# Patient Record
Sex: FEMALE | Employment: OTHER | URBAN - METROPOLITAN AREA
[De-identification: names, ages, dates, MRNs, and addresses within clinical notes are randomized per-mention and may not be internally consistent; named-entity substitution may affect disease eponyms.]

---

## 2023-08-11 ENCOUNTER — TELEPHONE (OUTPATIENT)
Age: 38
End: 2023-08-11

## 2023-08-11 NOTE — TELEPHONE ENCOUNTER
Hourly rounds completed this shift. All needs met at this time. Bed low/locked. Call light within reach. Will continue to monitor and give bedside report to oncoming nurse. Pt has been NPO since MN. Sent pt [employer] message to reschedule appt she has 10/5/23 due to NP [facility] out of office this day. Please reschedule pt with another provider. -TM 8/11/23

## 2023-11-03 SDOH — HEALTH STABILITY: PHYSICAL HEALTH: ON AVERAGE, HOW MANY DAYS PER WEEK DO YOU ENGAGE IN MODERATE TO STRENUOUS EXERCISE (LIKE A BRISK WALK)?: 5 DAYS

## 2023-11-03 SDOH — HEALTH STABILITY: PHYSICAL HEALTH: ON AVERAGE, HOW MANY MINUTES DO YOU ENGAGE IN EXERCISE AT THIS LEVEL?: 30 MIN

## 2023-11-06 ENCOUNTER — OFFICE VISIT (OUTPATIENT)
Age: 38
End: 2023-11-06
Payer: COMMERCIAL

## 2023-11-06 VITALS
DIASTOLIC BLOOD PRESSURE: 78 MMHG | SYSTOLIC BLOOD PRESSURE: 123 MMHG | HEIGHT: 65 IN | RESPIRATION RATE: 16 BRPM | WEIGHT: 197 LBS | BODY MASS INDEX: 32.82 KG/M2 | OXYGEN SATURATION: 100 % | HEART RATE: 101 BPM | TEMPERATURE: 98.2 F

## 2023-11-06 DIAGNOSIS — D50.9 IRON DEFICIENCY ANEMIA, UNSPECIFIED IRON DEFICIENCY ANEMIA TYPE: ICD-10-CM

## 2023-11-06 DIAGNOSIS — H93.A1 PULSATILE TINNITUS OF RIGHT EAR: ICD-10-CM

## 2023-11-06 DIAGNOSIS — K21.9 GASTROESOPHAGEAL REFLUX DISEASE WITHOUT ESOPHAGITIS: Primary | ICD-10-CM

## 2023-11-06 DIAGNOSIS — E05.90 HYPERTHYROIDISM: ICD-10-CM

## 2023-11-06 DIAGNOSIS — F41.1 GAD (GENERALIZED ANXIETY DISORDER): ICD-10-CM

## 2023-11-06 DIAGNOSIS — Z76.89 ENCOUNTER TO ESTABLISH CARE: ICD-10-CM

## 2023-11-06 LAB
ALBUMIN SERPL-MCNC: 3.7 G/DL (ref 3.5–5)
ALBUMIN/GLOB SERPL: 1 (ref 1.1–2.2)
ALP SERPL-CCNC: 69 U/L (ref 45–117)
ALT SERPL-CCNC: 17 U/L (ref 12–78)
ANION GAP SERPL CALC-SCNC: 7 MMOL/L (ref 5–15)
AST SERPL-CCNC: 11 U/L (ref 15–37)
BILIRUB SERPL-MCNC: 0.4 MG/DL (ref 0.2–1)
BUN SERPL-MCNC: 12 MG/DL (ref 6–20)
BUN/CREAT SERPL: 21 (ref 12–20)
CALCIUM SERPL-MCNC: 8.8 MG/DL (ref 8.5–10.1)
CHLORIDE SERPL-SCNC: 108 MMOL/L (ref 97–108)
CO2 SERPL-SCNC: 25 MMOL/L (ref 21–32)
CREAT SERPL-MCNC: 0.58 MG/DL (ref 0.55–1.02)
ERYTHROCYTE [DISTWIDTH] IN BLOOD BY AUTOMATED COUNT: 12.2 % (ref 11.5–14.5)
FERRITIN SERPL-MCNC: 13 NG/ML (ref 8–252)
GLOBULIN SER CALC-MCNC: 3.8 G/DL (ref 2–4)
GLUCOSE SERPL-MCNC: 100 MG/DL (ref 65–100)
HCT VFR BLD AUTO: 39.7 % (ref 35–47)
HGB BLD-MCNC: 13 G/DL (ref 11.5–16)
IRON SATN MFR SERPL: 11 % (ref 20–50)
IRON SERPL-MCNC: 38 UG/DL (ref 35–150)
MCH RBC QN AUTO: 27.5 PG (ref 26–34)
MCHC RBC AUTO-ENTMCNC: 32.7 G/DL (ref 30–36.5)
MCV RBC AUTO: 84.1 FL (ref 80–99)
NRBC # BLD: 0 K/UL (ref 0–0.01)
NRBC BLD-RTO: 0 PER 100 WBC
PLATELET # BLD AUTO: 261 K/UL (ref 150–400)
PMV BLD AUTO: 10.7 FL (ref 8.9–12.9)
POTASSIUM SERPL-SCNC: 4.2 MMOL/L (ref 3.5–5.1)
PROT SERPL-MCNC: 7.5 G/DL (ref 6.4–8.2)
RBC # BLD AUTO: 4.72 M/UL (ref 3.8–5.2)
SODIUM SERPL-SCNC: 140 MMOL/L (ref 136–145)
T4 FREE SERPL-MCNC: 1.9 NG/DL (ref 0.8–1.5)
TIBC SERPL-MCNC: 332 UG/DL (ref 250–450)
TSH SERPL DL<=0.05 MIU/L-ACNC: 0.01 UIU/ML (ref 0.36–3.74)
WBC # BLD AUTO: 5.6 K/UL (ref 3.6–11)

## 2023-11-06 PROCEDURE — 99204 OFFICE O/P NEW MOD 45 MIN: CPT | Performed by: NURSE PRACTITIONER

## 2023-11-06 RX ORDER — OMEPRAZOLE 20 MG/1
20 CAPSULE, DELAYED RELEASE ORAL DAILY
Qty: 30 CAPSULE | Refills: 5 | Status: SHIPPED | OUTPATIENT
Start: 2023-11-06

## 2023-11-06 RX ORDER — ESCITALOPRAM OXALATE 10 MG/1
10 TABLET ORAL DAILY
Qty: 30 TABLET | Refills: 1 | Status: SHIPPED | OUTPATIENT
Start: 2023-11-06

## 2023-11-06 RX ORDER — OMEPRAZOLE 20 MG/1
20 CAPSULE, DELAYED RELEASE ORAL DAILY
COMMUNITY
End: 2023-11-06 | Stop reason: SDUPTHER

## 2023-11-06 SDOH — ECONOMIC STABILITY: HOUSING INSECURITY
IN THE LAST 12 MONTHS, WAS THERE A TIME WHEN YOU DID NOT HAVE A STEADY PLACE TO SLEEP OR SLEPT IN A SHELTER (INCLUDING NOW)?: NO

## 2023-11-06 SDOH — ECONOMIC STABILITY: FOOD INSECURITY: WITHIN THE PAST 12 MONTHS, YOU WORRIED THAT YOUR FOOD WOULD RUN OUT BEFORE YOU GOT MONEY TO BUY MORE.: NEVER TRUE

## 2023-11-06 SDOH — ECONOMIC STABILITY: INCOME INSECURITY: HOW HARD IS IT FOR YOU TO PAY FOR THE VERY BASICS LIKE FOOD, HOUSING, MEDICAL CARE, AND HEATING?: NOT HARD AT ALL

## 2023-11-06 SDOH — ECONOMIC STABILITY: FOOD INSECURITY: WITHIN THE PAST 12 MONTHS, THE FOOD YOU BOUGHT JUST DIDN'T LAST AND YOU DIDN'T HAVE MONEY TO GET MORE.: NEVER TRUE

## 2023-11-06 ASSESSMENT — ENCOUNTER SYMPTOMS
SINUS PAIN: 0
SHORTNESS OF BREATH: 0
SINUS PRESSURE: 0
CHEST TIGHTNESS: 0

## 2023-11-06 ASSESSMENT — PATIENT HEALTH QUESTIONNAIRE - PHQ9
1. LITTLE INTEREST OR PLEASURE IN DOING THINGS: 0
SUM OF ALL RESPONSES TO PHQ QUESTIONS 1-9: 0
2. FEELING DOWN, DEPRESSED OR HOPELESS: 0
SUM OF ALL RESPONSES TO PHQ QUESTIONS 1-9: 0
SUM OF ALL RESPONSES TO PHQ9 QUESTIONS 1 & 2: 0

## 2023-11-06 NOTE — PROGRESS NOTES
Chief Complaint   Patient presents with    New Patient     1. \"Have you been to the ER, urgent care clinic since your last visit? Hospitalized since your last visit? \" UC for ear problem 2 months ago negative exam     2. \"Have you seen or consulted any other health care providers outside of the [address] [place] [address] since your last visit? \"  No     Pap - yes done 2022 advised every 3 years   Just moved here from [address] [place] [address] - done 2022

## 2023-11-09 DIAGNOSIS — E05.00 GRAVES DISEASE: Primary | ICD-10-CM

## 2023-11-15 DIAGNOSIS — H93.A9 PULSATILE TINNITUS: Primary | ICD-10-CM

## 2023-12-04 ENCOUNTER — OFFICE VISIT (OUTPATIENT)
Age: 38
End: 2023-12-04
Payer: COMMERCIAL

## 2023-12-04 VITALS
TEMPERATURE: 97.7 F | RESPIRATION RATE: 18 BRPM | SYSTOLIC BLOOD PRESSURE: 112 MMHG | HEART RATE: 78 BPM | DIASTOLIC BLOOD PRESSURE: 76 MMHG | HEIGHT: 65 IN | OXYGEN SATURATION: 99 % | BODY MASS INDEX: 32.89 KG/M2 | WEIGHT: 197.4 LBS

## 2023-12-04 DIAGNOSIS — F41.1 GAD (GENERALIZED ANXIETY DISORDER): Primary | ICD-10-CM

## 2023-12-04 DIAGNOSIS — E05.90 HYPERTHYROIDISM: ICD-10-CM

## 2023-12-04 PROCEDURE — 99213 OFFICE O/P EST LOW 20 MIN: CPT | Performed by: NURSE PRACTITIONER

## 2023-12-04 RX ORDER — ESCITALOPRAM OXALATE 10 MG/1
10 TABLET ORAL DAILY
Qty: 90 TABLET | Refills: 1 | Status: SHIPPED | OUTPATIENT
Start: 2023-12-04

## 2023-12-04 RX ORDER — FERROUS SULFATE 325(65) MG
325 TABLET ORAL
COMMUNITY

## 2023-12-04 ASSESSMENT — PATIENT HEALTH QUESTIONNAIRE - PHQ9
SUM OF ALL RESPONSES TO PHQ QUESTIONS 1-9: 0
2. FEELING DOWN, DEPRESSED OR HOPELESS: 0
SUM OF ALL RESPONSES TO PHQ QUESTIONS 1-9: 0
1. LITTLE INTEREST OR PLEASURE IN DOING THINGS: 0
SUM OF ALL RESPONSES TO PHQ9 QUESTIONS 1 & 2: 0
SUM OF ALL RESPONSES TO PHQ QUESTIONS 1-9: 0
SUM OF ALL RESPONSES TO PHQ QUESTIONS 1-9: 0

## 2023-12-04 ASSESSMENT — ENCOUNTER SYMPTOMS
CHEST TIGHTNESS: 0
SHORTNESS OF BREATH: 0

## 2024-02-12 ENCOUNTER — OFFICE VISIT (OUTPATIENT)
Age: 39
End: 2024-02-12
Payer: COMMERCIAL

## 2024-02-12 VITALS
WEIGHT: 207.6 LBS | HEIGHT: 65 IN | RESPIRATION RATE: 16 BRPM | DIASTOLIC BLOOD PRESSURE: 71 MMHG | BODY MASS INDEX: 34.59 KG/M2 | SYSTOLIC BLOOD PRESSURE: 108 MMHG | OXYGEN SATURATION: 99 % | HEART RATE: 65 BPM | TEMPERATURE: 98 F

## 2024-02-12 DIAGNOSIS — D50.9 IRON DEFICIENCY ANEMIA, UNSPECIFIED IRON DEFICIENCY ANEMIA TYPE: ICD-10-CM

## 2024-02-12 DIAGNOSIS — F41.1 GAD (GENERALIZED ANXIETY DISORDER): ICD-10-CM

## 2024-02-12 DIAGNOSIS — E05.90 HYPERTHYROIDISM: Primary | ICD-10-CM

## 2024-02-12 DIAGNOSIS — K21.9 GASTROESOPHAGEAL REFLUX DISEASE WITHOUT ESOPHAGITIS: ICD-10-CM

## 2024-02-12 PROCEDURE — 99214 OFFICE O/P EST MOD 30 MIN: CPT | Performed by: NURSE PRACTITIONER

## 2024-02-12 NOTE — PROGRESS NOTES
Subjective:      Patient ID: Kianna Cuellar is a 38 y.o. female.    HPI  Follow up health problems.  Follow up hyperthyroid.  She is s/p radioactive ablation.  Was on methimazole for about a year then stopped when thyroid function normalized.  Had labs done here last month with mild T4 elevation and low TSH.  Had previous labs done when living in [place].    Has upcoming appointment in May with Dr. Genao.    LADONNA.  Taking lexapro with good symptoms control.    GERD.  Taking omeprazole daily.   GLORIA with history of menorrhagia.  Followed by gyn.  Taking daily iron supplement.  Reports still has heavy menses first 2 days of cycle lasting about 4-5 days. Had IUD in the past but discontinued due to recurrent BV.   Last labs stable.     Patient Active Problem List   Diagnosis    Gastroesophageal reflux disease without esophagitis    Hyperthyroidism    Iron deficiency anemia    LADONNA (generalized anxiety disorder)     Current Outpatient Medications   Medication Sig    ferrous sulfate (IRON 325) 325 (65 Fe) MG tablet Take 1 tablet by mouth daily (with breakfast)    escitalopram (LEXAPRO) 10 MG tablet Take 1 tablet by mouth daily    omeprazole (PRILOSEC) 20 MG delayed release capsule Take 1 capsule by mouth daily Heartburn/gerd     No current facility-administered medications for this visit.     Social History     Tobacco Use    Smoking status: Never    Smokeless tobacco: Never   Vaping Use    Vaping Use: Never used   Substance Use Topics    Alcohol use: Never    Drug use: Never     Blood pressure 108/71, pulse 65, temperature 98 °F (36.7 °C), temperature source Temporal, resp. rate 16, height 1.651 m (5' 5\"), weight 94.2 kg (207 lb 9.6 oz), last menstrual period 02/05/2024, SpO2 99 %.      Review of Systems   Constitutional:  Negative for fatigue.   Respiratory:  Negative for chest tightness and shortness of breath.    Cardiovascular:  Positive for palpitations (rare). Negative for chest pain and leg swelling.   Genitourinary:

## 2024-02-12 NOTE — PROGRESS NOTES
Chief Complaint   Patient presents with    Hypothyroidism     Follow up    Medication Check     Follow up     \"Have you been to the ER, urgent care clinic since your last visit?  Hospitalized since your last visit?\"    NO    “Have you seen or consulted any other health care providers outside of [facility] since your last visit?”    NO        “Have you had a pap smear?”    YES - Where: at [facility] [place] Nurse/CMA to request most recent records if not in the chart                2/12/2024     8:58 AM   PHQ-9    Little interest or pleasure in doing things 0   Feeling down, depressed, or hopeless 0   PHQ-2 Score 0   PHQ-9 Total Score 0           Financial Resource Strain: Low Risk  (11/6/2023)    Overall Financial Resource Strain (CARDIA)     Difficulty of Paying Living Expenses: Not hard at all      Food Insecurity: Not on file (11/6/2023)          Health Maintenance Due   Topic Date Due    Hepatitis B vaccine (1 of 3 - 3-dose series) Never done    COVID-19 Vaccine (1) Never done    Varicella vaccine (1 of 2 - 2-dose childhood series) Never done    HIV screen  Never done    Hepatitis C screen  Never done    DTaP/Tdap/Td vaccine (1 - Tdap) Never done    Cervical cancer screen  Never done    Flu vaccine (1) Never done

## 2024-02-13 LAB
ANION GAP SERPL CALC-SCNC: 3 MMOL/L (ref 5–15)
BUN SERPL-MCNC: 13 MG/DL (ref 6–20)
BUN/CREAT SERPL: 21 (ref 12–20)
CALCIUM SERPL-MCNC: 8.8 MG/DL (ref 8.5–10.1)
CHLORIDE SERPL-SCNC: 113 MMOL/L (ref 97–108)
CO2 SERPL-SCNC: 25 MMOL/L (ref 21–32)
CREAT SERPL-MCNC: 0.61 MG/DL (ref 0.55–1.02)
ERYTHROCYTE [DISTWIDTH] IN BLOOD BY AUTOMATED COUNT: 13.2 % (ref 11.5–14.5)
FERRITIN SERPL-MCNC: 10 NG/ML (ref 26–388)
GLUCOSE SERPL-MCNC: 99 MG/DL (ref 65–100)
HCT VFR BLD AUTO: 39.2 % (ref 35–47)
HGB BLD-MCNC: 12 G/DL (ref 11.5–16)
IRON SATN MFR SERPL: 17 % (ref 20–50)
IRON SERPL-MCNC: 52 UG/DL (ref 35–150)
MCH RBC QN AUTO: 26.1 PG (ref 26–34)
MCHC RBC AUTO-ENTMCNC: 30.6 G/DL (ref 30–36.5)
MCV RBC AUTO: 85.4 FL (ref 80–99)
NRBC # BLD: 0 K/UL (ref 0–0.01)
NRBC BLD-RTO: 0 PER 100 WBC
PLATELET # BLD AUTO: 277 K/UL (ref 150–400)
PMV BLD AUTO: 11.1 FL (ref 8.9–12.9)
POTASSIUM SERPL-SCNC: 4.2 MMOL/L (ref 3.5–5.1)
RBC # BLD AUTO: 4.59 M/UL (ref 3.8–5.2)
SODIUM SERPL-SCNC: 141 MMOL/L (ref 136–145)
T4 FREE SERPL-MCNC: 1.7 NG/DL (ref 0.8–1.5)
TIBC SERPL-MCNC: 315 UG/DL (ref 250–450)
TSH SERPL DL<=0.05 MIU/L-ACNC: <0.01 UIU/ML (ref 0.36–3.74)
WBC # BLD AUTO: 4.3 K/UL (ref 3.6–11)

## 2024-05-02 ENCOUNTER — OFFICE VISIT (OUTPATIENT)
Age: 39
End: 2024-05-02
Payer: COMMERCIAL

## 2024-05-02 VITALS
OXYGEN SATURATION: 97 % | RESPIRATION RATE: 16 BRPM | SYSTOLIC BLOOD PRESSURE: 116 MMHG | WEIGHT: 205.1 LBS | DIASTOLIC BLOOD PRESSURE: 72 MMHG | HEART RATE: 64 BPM | HEIGHT: 65 IN | BODY MASS INDEX: 34.17 KG/M2

## 2024-05-02 DIAGNOSIS — E05.00 GRAVES DISEASE: Primary | ICD-10-CM

## 2024-05-02 PROCEDURE — 99204 OFFICE O/P NEW MOD 45 MIN: CPT | Performed by: INTERNAL MEDICINE

## 2024-05-02 RX ORDER — METHIMAZOLE 10 MG/1
10 TABLET ORAL DAILY
Qty: 90 TABLET | Refills: 1 | Status: SHIPPED | OUTPATIENT
Start: 2024-05-02 | End: 2024-06-01

## 2024-05-02 NOTE — PROGRESS NOTES
Chief Complaint   Patient presents with    Thyroid Problem     Pt stated that she has not needed  thyroid medications in over 10 yrs.      New Patient    Weight Management     History of Present Illness: Kianna Cuellar is a 38 y.o. female seen for discussion related to uncontrolled Graves' disease.    Received radioactive iodine in 2006, 2 years.  Reports that symptoms never resolved.  Does take daily iron, menstrual periods became much heavier after the birth of her daughter in 2005.  Half sister also has hypERthyroidism.  Has noted palpitations, tremor    Past Medical History:   Diagnosis Date    GERD (gastroesophageal reflux disease) 3/2021    Hyperthyroidism 8/2006     Past Surgical History:   Procedure Laterality Date    CHOLECYSTECTOMY  12/2022    UPPER GASTROINTESTINAL ENDOSCOPY  1/2023     Current Outpatient Medications   Medication Sig    ferrous sulfate (IRON 325) 325 (65 Fe) MG tablet Take 1 tablet by mouth daily (with breakfast)    escitalopram (LEXAPRO) 10 MG tablet Take 1 tablet by mouth daily    omeprazole (PRILOSEC) 20 MG delayed release capsule Take 1 capsule by mouth daily Heartburn/gerd     No current facility-administered medications for this visit.     No Known Allergies  Family History   Problem Relation Age of Onset    High Blood Pressure Mother     Cancer Father         Kidney    Colon Cancer Maternal Grandmother     High Blood Pressure Maternal Grandmother     Stroke Maternal Grandmother     Colon Cancer Maternal Aunt        Social Hx: 19-year-old daughter at school in [place]  Seldom alcohol, non-smoker    Review of Systems:  See HPI    Physical Examination:  /72   Pulse 64   Resp 16   Ht 1.651 m (5' 5\")   Wt 93 kg (205 lb 1.6 oz)   SpO2 97%   BMI 34.13 kg/m²     - GENERAL: NCAT, Appears hyperthyroid  - EYES: Prominent stare  - Ear/Nose/Throat: Does have thyromegaly  - CARDIOVASCULAR: no cyanosis, no visible JVD   - RESPIRATORY: respiratory effort normal without any distress or

## 2024-05-17 LAB
ALBUMIN SERPL-MCNC: 3.6 G/DL (ref 3.5–5)
ALBUMIN/GLOB SERPL: 1 (ref 1.1–2.2)
ALP SERPL-CCNC: 81 U/L (ref 45–117)
ALT SERPL-CCNC: 24 U/L (ref 12–78)
AST SERPL-CCNC: 16 U/L (ref 15–37)
BILIRUB DIRECT SERPL-MCNC: 0.1 MG/DL (ref 0–0.2)
BILIRUB SERPL-MCNC: 0.3 MG/DL (ref 0.2–1)
GLOBULIN SER CALC-MCNC: 3.7 G/DL (ref 2–4)
PROT SERPL-MCNC: 7.3 G/DL (ref 6.4–8.2)
T4 FREE SERPL-MCNC: 1.4 NG/DL (ref 0.8–1.5)
TSH SERPL DL<=0.05 MIU/L-ACNC: <0.01 UIU/ML (ref 0.36–3.74)
WBC # BLD AUTO: 6.1 K/UL (ref 3.6–11)

## 2024-05-18 LAB — TSI ACT/NOR SER: 1.19 IU/L (ref 0–0.55)

## 2024-05-19 LAB — T3 SERPL-MCNC: 159 NG/DL (ref 71–180)

## 2024-05-20 DIAGNOSIS — E05.00 GRAVES DISEASE: Primary | ICD-10-CM

## 2024-05-20 RX ORDER — METHIMAZOLE 10 MG/1
10 TABLET ORAL DAILY
Qty: 90 TABLET | Refills: 1 | Status: SHIPPED | OUTPATIENT
Start: 2024-05-20 | End: 2024-06-19

## 2024-05-23 DIAGNOSIS — K21.9 GASTROESOPHAGEAL REFLUX DISEASE WITHOUT ESOPHAGITIS: ICD-10-CM

## 2024-05-23 RX ORDER — OMEPRAZOLE 20 MG/1
CAPSULE, DELAYED RELEASE ORAL
Qty: 30 CAPSULE | Refills: 5 | Status: SHIPPED | OUTPATIENT
Start: 2024-05-23

## 2024-05-23 NOTE — TELEPHONE ENCOUNTER
PCP: Bisi Ruffin APRN - NP      Future Appointments   Date Time Provider Department Center   8/13/2024  8:30 AM Chitra Genao MD RDE [facility] BS AMB   8/22/2024  9:20 AM Bisi Ruffin APRN - NP [facility]       Requested Prescriptions     Pending Prescriptions Disp Refills    omeprazole (PRILOSEC) 20 MG delayed release capsule [Pharmacy Med Name: OMEPRAZOLE DR 20 MG CAPSULE] 30 capsule 5     Sig: TAKE 1 CAPSULE BY MOUTH DAILY (FOR HEARTBURN/GERD)       Prior labs and Blood pressures:  BP Readings from Last 3 Encounters:   05/02/24 116/72   02/12/24 108/71   12/04/23 112/76     Lab Results   Component Value Date/Time     02/12/2024 09:32 AM    K 4.2 02/12/2024 09:32 AM     02/12/2024 09:32 AM    CO2 25 02/12/2024 09:32 AM    BUN 13 02/12/2024 09:32 AM     No results found for: \"HBA1C\", \"PMJ8HPVP\"  No results found for: \"CHOL\", \"CHOLPOCT\", \"CHLST\", \"CHOLV\", \"HDL\", \"HDLPOC\", \"HDLC\", \"LDL\", \"VLDLC\", \"VLDL\"  No results found for: \"VITD3\"    No results found for: \"TSH\", \"TSH2\", \"TSH3\"

## 2024-06-13 ENCOUNTER — TELEPHONE (OUTPATIENT)
Age: 39
End: 2024-06-13

## 2024-06-13 DIAGNOSIS — F41.1 GAD (GENERALIZED ANXIETY DISORDER): ICD-10-CM

## 2024-06-13 DIAGNOSIS — K21.9 GASTROESOPHAGEAL REFLUX DISEASE WITHOUT ESOPHAGITIS: ICD-10-CM

## 2024-06-13 NOTE — TELEPHONE ENCOUNTER
NP Augustin,    Call from patient pertaining to issue with omeprazole.      Noted rx 5/23/24 for 30 + 5.    Contacted [facility]:    Rejecting: Max QTY of 180/365 days.      Requires PA     Call [phone]    Or via [employer]    ThanksNallely      For Pharmacy Admin Tracking Only    Program: Medication Refill  CPA in place:    Recommendation Provided To:   Intervention Detail: New Rx: 1, reason: Patient Preference  Intervention Accepted By:   Gap Closed?:    Time Spent (min): 5

## 2024-06-14 RX ORDER — ESCITALOPRAM OXALATE 10 MG/1
10 TABLET ORAL DAILY
Qty: 90 TABLET | Refills: 1 | Status: SHIPPED | OUTPATIENT
Start: 2024-06-14

## 2024-06-14 RX ORDER — OMEPRAZOLE 20 MG/1
CAPSULE, DELAYED RELEASE ORAL
Qty: 30 CAPSULE | Refills: 5 | OUTPATIENT
Start: 2024-06-14

## 2024-06-14 NOTE — TELEPHONE ENCOUNTER
PCP: Bisi Ruffin APRN - NP      Future Appointments   Date Time Provider Department Center   8/13/2024  8:30 AM Chitra Genao MD RDE [facility] BS AMB   8/22/2024  9:20 AM [facility]       Requested Prescriptions     Pending Prescriptions Disp Refills    escitalopram (LEXAPRO) 10 MG tablet 90 tablet 1     Sig: Take 1 tablet by mouth daily       Prior labs and Blood pressures:  BP Readings from Last 3 Encounters:   05/02/24 116/72   02/12/24 108/71   12/04/23 112/76     Lab Results   Component Value Date/Time     02/12/2024 09:32 AM    K 4.2 02/12/2024 09:32 AM     02/12/2024 09:32 AM    CO2 25 02/12/2024 09:32 AM    BUN 13 02/12/2024 09:32 AM     No results found for: \"HBA1C\", \"SEN0UXKN\"  No results found for: \"CHOL\", \"CHOLPOCT\", \"CHLST\", \"CHOLV\", \"HDL\", \"HDLPOC\", \"HDLC\", \"LDL\", \"VLDLC\", \"VLDL\"  No results found for: \"VITD3\"    No results found for: \"TSH\", \"TSH2\", \"TSH3\"

## 2024-06-17 LAB
ALBUMIN SERPL-MCNC: 3.7 G/DL (ref 3.5–5)
ALBUMIN/GLOB SERPL: 1 (ref 1.1–2.2)
ALP SERPL-CCNC: 79 U/L (ref 45–117)
ALT SERPL-CCNC: 21 U/L (ref 12–78)
AST SERPL-CCNC: 12 U/L (ref 15–37)
BILIRUB DIRECT SERPL-MCNC: 0.1 MG/DL (ref 0–0.2)
BILIRUB SERPL-MCNC: 0.4 MG/DL (ref 0.2–1)
GLOBULIN SER CALC-MCNC: 3.6 G/DL (ref 2–4)
PROT SERPL-MCNC: 7.3 G/DL (ref 6.4–8.2)
T4 FREE SERPL-MCNC: 1.3 NG/DL (ref 0.8–1.5)
TSH SERPL DL<=0.05 MIU/L-ACNC: 0.02 UIU/ML (ref 0.36–3.74)
WBC # BLD AUTO: 6.1 K/UL (ref 3.6–11)

## 2024-06-18 LAB — TSI ACT/NOR SER: 1.04 IU/L (ref 0–0.55)

## 2024-06-19 LAB — T3 SERPL-MCNC: 138 NG/DL (ref 71–180)

## 2024-06-21 DIAGNOSIS — E05.00 GRAVES DISEASE: Primary | ICD-10-CM

## 2024-06-21 RX ORDER — METHIMAZOLE 10 MG/1
10 TABLET ORAL DAILY
Qty: 90 TABLET | Refills: 1 | Status: SHIPPED | OUTPATIENT
Start: 2024-06-21 | End: 2024-07-21

## 2024-07-24 DIAGNOSIS — F41.1 GAD (GENERALIZED ANXIETY DISORDER): Primary | ICD-10-CM

## 2024-07-31 ENCOUNTER — OFFICE VISIT (OUTPATIENT)
Age: 39
End: 2024-07-31
Payer: COMMERCIAL

## 2024-07-31 DIAGNOSIS — F41.9 ANXIETY: Primary | ICD-10-CM

## 2024-07-31 PROCEDURE — 90791 PSYCH DIAGNOSTIC EVALUATION: CPT | Performed by: SOCIAL WORKER

## 2024-07-31 NOTE — PROGRESS NOTES
In Office-Initial Evaluation Billing    Session Time     Start Time:    10:00 am  Finish Time:  10:58 am    Total time spent for this encounter:  58 minutes    Follow up:  August 14 @ 10 in office    We did set a follow-up appointment with this clinician.        Therapy Modalities   Building Insight and Cognitive Behavioral    Assessment / Plan     Kianna Cuellar is a 38 y.o. female who is alert and oriented X3.  She does not have any suicidal or homicidal ideations.  Patient is not psychotic or delusional.   She has not been psychiatrically admitted to a hospital. Ms. Cuellar does have good eye contact during this interview. She is  verbal and engaging.  Patient does have good insight and judgement.  She is  a good candidate for short term therapy to address the above issues.      Psychotherapy Target Symptoms:  anxiety and OCD     Assessment:   initial appt    Plan:  continue psychotherapy    1. Anxiety       --Corazon Zelaya LCSW on 7/31/2024 at 12:03 PM    An electronic signature was used to authenticate this note.

## 2024-07-31 NOTE — PSYCHOTHERAPY
In Office-Initial Evaluation    Time Start:10:00 am  Time End:10:58 am    DX:    Diagnosis Orders   1. Anxiety                 Met with Ms. Cuellar 7/31/2024 for our first appointment.  This patient was referred by her NP due to anxiety and OCD.  She has been prescribed Lexapro 10 mg and is reporting she is doing better with her ruminating thoughts and some of her behaviors. Ms. Cuellar has been on Lexapro since November 2023.   She is happy that she is here as she has wanted to start therapy for awhile.    Ms. Cuellar has lived in [place] for about a year and moved here from [place].  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  She grew up being raised by a single mother and has a sister 13 years older.  She only \"saw\" her father the first several months of her life.  Ms. Cuellar got pregnant as a teen and dropped out of high school.  As a teen, the patient moved out of her home with her mother and lived with her maternal grandmother.  It apparently was a difficult time for her mother and she could not \"handle having a teen in the home.\"   The patient later went back and got her GED and attended college at the [employer] while raising her infant daughter.  Her daughter is now 19 years old and attending the [employer].  When Ms. Cuellar moved to [place] it was because her mother had retired and decided to move here, however, since that time, they have had a falling out and have not spoken.  The patient also reports she hasn't spoken to her older sister for over 10 years.  Ms. Cuellar reports when she was in high school she started pulling her eyelashes out as a way to \"calm myself.\"  She will sometimes still resort to doing this but she is much more aware of this behavior and tries to distract herself with something else.  Although the medication has helped her with her OCD, she still finds she will act on some of her ruminations.    Kianna Cuellar is a 38 y.o.

## 2024-08-06 LAB
ALBUMIN SERPL-MCNC: 3.5 G/DL (ref 3.5–5)
ALBUMIN/GLOB SERPL: 0.9 (ref 1.1–2.2)
ALP SERPL-CCNC: 93 U/L (ref 45–117)
ALT SERPL-CCNC: 18 U/L (ref 12–78)
AST SERPL-CCNC: 9 U/L (ref 15–37)
BILIRUB DIRECT SERPL-MCNC: 0.1 MG/DL (ref 0–0.2)
BILIRUB SERPL-MCNC: 0.4 MG/DL (ref 0.2–1)
GLOBULIN SER CALC-MCNC: 4 G/DL (ref 2–4)
PROT SERPL-MCNC: 7.5 G/DL (ref 6.4–8.2)
T4 FREE SERPL-MCNC: 1.1 NG/DL (ref 0.8–1.5)
TSH SERPL DL<=0.05 MIU/L-ACNC: 0.72 UIU/ML (ref 0.36–3.74)
WBC # BLD AUTO: 6 K/UL (ref 3.6–11)

## 2024-08-07 LAB — T3 SERPL-MCNC: 127 NG/DL (ref 71–180)

## 2024-08-08 ENCOUNTER — OFFICE VISIT (OUTPATIENT)
Age: 39
End: 2024-08-08
Payer: COMMERCIAL

## 2024-08-08 VITALS
TEMPERATURE: 97.5 F | DIASTOLIC BLOOD PRESSURE: 74 MMHG | BODY MASS INDEX: 34.42 KG/M2 | WEIGHT: 206.6 LBS | HEIGHT: 65 IN | RESPIRATION RATE: 16 BRPM | HEART RATE: 61 BPM | OXYGEN SATURATION: 99 % | SYSTOLIC BLOOD PRESSURE: 113 MMHG

## 2024-08-08 DIAGNOSIS — E05.90 HYPERTHYROIDISM: ICD-10-CM

## 2024-08-08 DIAGNOSIS — R00.2 HEART PALPITATIONS: ICD-10-CM

## 2024-08-08 DIAGNOSIS — F41.1 GAD (GENERALIZED ANXIETY DISORDER): ICD-10-CM

## 2024-08-08 DIAGNOSIS — D50.9 IRON DEFICIENCY ANEMIA, UNSPECIFIED IRON DEFICIENCY ANEMIA TYPE: Primary | ICD-10-CM

## 2024-08-08 DIAGNOSIS — K21.9 GASTROESOPHAGEAL REFLUX DISEASE WITHOUT ESOPHAGITIS: ICD-10-CM

## 2024-08-08 DIAGNOSIS — K31.A0 INTESTINAL METAPLASIA OF GASTRIC MUCOSA: ICD-10-CM

## 2024-08-08 PROBLEM — F42.9 OBSESSIVE-COMPULSIVE DISORDER: Status: ACTIVE | Noted: 2024-08-08

## 2024-08-08 LAB
ANION GAP SERPL CALC-SCNC: 1 MMOL/L (ref 5–15)
BUN SERPL-MCNC: 12 MG/DL (ref 6–20)
BUN/CREAT SERPL: 16 (ref 12–20)
CALCIUM SERPL-MCNC: 8.8 MG/DL (ref 8.5–10.1)
CHLORIDE SERPL-SCNC: 109 MMOL/L (ref 97–108)
CO2 SERPL-SCNC: 28 MMOL/L (ref 21–32)
CREAT SERPL-MCNC: 0.74 MG/DL (ref 0.55–1.02)
ERYTHROCYTE [DISTWIDTH] IN BLOOD BY AUTOMATED COUNT: 14.4 % (ref 11.5–14.5)
FERRITIN SERPL-MCNC: 8 NG/ML (ref 8–252)
GLUCOSE SERPL-MCNC: 102 MG/DL (ref 65–100)
HCT VFR BLD AUTO: 39.2 % (ref 35–47)
HGB BLD-MCNC: 12.3 G/DL (ref 11.5–16)
IRON SATN MFR SERPL: 11 % (ref 20–50)
IRON SERPL-MCNC: 38 UG/DL (ref 35–150)
MCH RBC QN AUTO: 26.3 PG (ref 26–34)
MCHC RBC AUTO-ENTMCNC: 31.4 G/DL (ref 30–36.5)
MCV RBC AUTO: 83.9 FL (ref 80–99)
NRBC # BLD: 0 K/UL (ref 0–0.01)
NRBC BLD-RTO: 0 PER 100 WBC
PLATELET # BLD AUTO: 268 K/UL (ref 150–400)
PMV BLD AUTO: 11.1 FL (ref 8.9–12.9)
POTASSIUM SERPL-SCNC: 4.1 MMOL/L (ref 3.5–5.1)
RBC # BLD AUTO: 4.67 M/UL (ref 3.8–5.2)
SODIUM SERPL-SCNC: 138 MMOL/L (ref 136–145)
TIBC SERPL-MCNC: 353 UG/DL (ref 250–450)
TSI ACT/NOR SER: 1 IU/L (ref 0–0.55)
WBC # BLD AUTO: 5.3 K/UL (ref 3.6–11)

## 2024-08-08 PROCEDURE — 99214 OFFICE O/P EST MOD 30 MIN: CPT | Performed by: NURSE PRACTITIONER

## 2024-08-08 RX ORDER — FLUVOXAMINE MALEATE 50 MG/1
25 TABLET, COATED ORAL NIGHTLY
COMMUNITY

## 2024-08-08 RX ORDER — ESCITALOPRAM OXALATE 10 MG/1
10 TABLET ORAL DAILY
Qty: 90 TABLET | Refills: 1 | Status: SHIPPED | OUTPATIENT
Start: 2024-08-08

## 2024-08-08 ASSESSMENT — PATIENT HEALTH QUESTIONNAIRE - PHQ9
SUM OF ALL RESPONSES TO PHQ QUESTIONS 1-9: 0
1. LITTLE INTEREST OR PLEASURE IN DOING THINGS: NOT AT ALL
SUM OF ALL RESPONSES TO PHQ9 QUESTIONS 1 & 2: 0
2. FEELING DOWN, DEPRESSED OR HOPELESS: NOT AT ALL
SUM OF ALL RESPONSES TO PHQ QUESTIONS 1-9: 0

## 2024-08-08 ASSESSMENT — ENCOUNTER SYMPTOMS
SHORTNESS OF BREATH: 0
CHEST TIGHTNESS: 0
BLOOD IN STOOL: 0
ABDOMINAL PAIN: 0

## 2024-08-08 NOTE — PROGRESS NOTES
Chief Complaint   Patient presents with    Follow-up     Overdue 6 months follow up for Hyperthyroidism, and LADONNA     \"Have you been to the ER, urgent care clinic since your last visit?  Hospitalized since your last visit?\"    NO    “Have you seen or consulted any other health care providers outside of [facility] since your last visit?”    YES - When: approximately 1  weeks ago.  Where and Why: [facility], Michelle Zuniga.     “Have you had a pap smear?”    NO    No cervical cancer screening on file             Click Here for Release of Records Request             8/8/2024     8:09 AM   PHQ-9    Little interest or pleasure in doing things 0   Feeling down, depressed, or hopeless 0   PHQ-2 Score 0   PHQ-9 Total Score 0           Financial Resource Strain: Low Risk  (11/6/2023)    Overall Financial Resource Strain (CARDIA)     Difficulty of Paying Living Expenses: Not hard at all      Food Insecurity: Not on file (11/6/2023)          Health Maintenance Due   Topic Date Due    Hepatitis C screen  Never done    Cervical cancer screen  Never done    Flu vaccine (1) 08/01/2024

## 2024-08-08 NOTE — PROGRESS NOTES
Subjective:      Patient ID: Kianna Cuellar is a 38 y.o. female     HPI  Follow up chronic conditions.   GLORIA.  Has been on iron supplement for several years.  Has some heavy menses.  Last ferritin and iron sat mildly low.    GERD.  Taking omeprazole daily with good symptom control.  Requesting referral to GI.  Had EGD in 2022 that showed intestinal metaplasia.  Follow up EGD recommended.    Hyperthyroid.  Followed by Dr. Genao.  Has recently resumed methimazole.  Has occasional heart palpitations, non sustained.    LADONNA/OCD.  Has began seeing psychiatrist.  Fluvoxamine recently added to lexapro for OCD.        Patient Active Problem List   Diagnosis    Gastroesophageal reflux disease without esophagitis    Hyperthyroidism    Iron deficiency anemia    LADONNA (generalized anxiety disorder)    Heart palpitations    Intestinal metaplasia of gastric mucosa    Obsessive-compulsive disorder     Current Outpatient Medications   Medication Sig    fluvoxaMINE (LUVOX) 50 MG tablet Take 0.5 tablets by mouth nightly    omeprazole (PRILOSEC) 20 MG delayed release capsule TAKE 1 CAPSULE BY MOUTH DAILY (FOR HEARTBURN/GERD)    ferrous sulfate (IRON 325) 325 (65 Fe) MG tablet Take 1 tablet by mouth daily (with breakfast)    escitalopram (LEXAPRO) 10 MG tablet Take 1 tablet by mouth daily     No current facility-administered medications for this visit.     Social History     Tobacco Use    Smoking status: Never    Smokeless tobacco: Never   Vaping Use    Vaping Use: Never used   Substance Use Topics    Alcohol use: Never    Drug use: Never     Blood pressure 113/74, pulse 61, temperature 97.5 °F (36.4 °C), temperature source Temporal, resp. rate 16, height 1.651 m (5' 5\"), weight 93.7 kg (206 lb 9.6 oz), last menstrual period 07/17/2024, SpO2 99 %.    Review of Systems   Constitutional:  Negative for fatigue and unexpected weight change.   Respiratory:  Negative for chest tightness and shortness of breath.    Cardiovascular:  Positive for

## 2024-08-13 ENCOUNTER — OFFICE VISIT (OUTPATIENT)
Age: 39
End: 2024-08-13
Payer: COMMERCIAL

## 2024-08-13 VITALS
RESPIRATION RATE: 16 BRPM | SYSTOLIC BLOOD PRESSURE: 107 MMHG | OXYGEN SATURATION: 98 % | BODY MASS INDEX: 34.07 KG/M2 | DIASTOLIC BLOOD PRESSURE: 71 MMHG | HEIGHT: 65 IN | WEIGHT: 204.5 LBS | HEART RATE: 61 BPM

## 2024-08-13 DIAGNOSIS — E05.00 GRAVES DISEASE: Primary | ICD-10-CM

## 2024-08-13 PROCEDURE — 99214 OFFICE O/P EST MOD 30 MIN: CPT | Performed by: INTERNAL MEDICINE

## 2024-08-13 PROCEDURE — G2211 COMPLEX E/M VISIT ADD ON: HCPCS | Performed by: INTERNAL MEDICINE

## 2024-08-13 RX ORDER — METHIMAZOLE 10 MG/1
10 TABLET ORAL DAILY
COMMUNITY
Start: 2024-07-30 | End: 2024-08-13 | Stop reason: SDUPTHER

## 2024-08-13 RX ORDER — METHIMAZOLE 10 MG/1
5 TABLET ORAL DAILY
Qty: 90 TABLET | Refills: 2 | Status: SHIPPED | OUTPATIENT
Start: 2024-08-13

## 2024-08-13 NOTE — PROGRESS NOTES
Chief Complaint   Patient presents with    Graves' Disease    Medication Refill     History of Present Illness: Kianna Cuellar is a 38 y.o. female seen for discussion related to uncontrolled Graves' disease.    Received radioactive iodine in 2006, 2 years.  Reports that symptoms never resolved.  Does take daily iron, menstrual periods became much heavier after the birth of her daughter in 2005.  Half sister also has hypERthyroidism.  Has noted palpitations, tremor    08/13/2024: Thinking perhaps she never received I131, maybe I123- took MMI for about 6 months.    Current Outpatient Medications   Medication Sig    methIMAzole (TAPAZOLE) 10 MG tablet Take 1 tablet by mouth daily    fluvoxaMINE (LUVOX) 50 MG tablet Take 0.5 tablets by mouth nightly    escitalopram (LEXAPRO) 10 MG tablet Take 1 tablet by mouth daily    omeprazole (PRILOSEC) 20 MG delayed release capsule TAKE 1 CAPSULE BY MOUTH DAILY (FOR HEARTBURN/GERD)    ferrous sulfate (IRON 325) 325 (65 Fe) MG tablet Take 1 tablet by mouth daily (with breakfast)     No current facility-administered medications for this visit.       Social Hx: 19-year-old daughter at school in [place]  Seldom alcohol, non-smoker    Review of Systems:  See HPI    Physical Examination:  /71   Pulse 61   Resp 16   Ht 1.651 m (5' 5\")   Wt 92.8 kg (204 lb 8 oz)   LMP 07/17/2024 (Approximate)   SpO2 98%   BMI 34.03 kg/m²     - GENERAL: NCAT, Appears hyperthyroid  - EYES: Prominent stare  - Ear/Nose/Throat: Does have thyromegaly  - CARDIOVASCULAR: no cyanosis, no visible JVD   - RESPIRATORY: respiratory effort normal without any distress or labored breathing   - MUSCULOSKELETAL: Normal ROM of neck and upper extremities observed   - SKIN: No rash on face  - NEUROLOGIC: Low amplitude high-frequency tremor with outstretched hands  - PSYCHIATRIC: Normal affect, Normal insight and judgement     Data Reviewed:    Latest Reference Range & Units 11/06/23 09:59 02/12/24 09:32 05/16/24

## 2024-08-14 ENCOUNTER — OFFICE VISIT (OUTPATIENT)
Age: 39
End: 2024-08-14
Payer: COMMERCIAL

## 2024-08-14 DIAGNOSIS — F42.2 MIXED OBSESSIONAL THOUGHTS AND ACTS: ICD-10-CM

## 2024-08-14 DIAGNOSIS — F41.9 ANXIETY: Primary | ICD-10-CM

## 2024-08-14 PROCEDURE — 90837 PSYTX W PT 60 MINUTES: CPT | Performed by: SOCIAL WORKER

## 2024-08-14 NOTE — PROGRESS NOTES
[facility] Patient states she doesn't want to stay. In office Follow Up Billing    Session Time     Start Time:    10:00 am  Finish Time:  11:00 am    Total time spent for this encounter:  60 minutes    We did set a follow-up appointment with this clinician.      Follow up appts.: Sept 11 @ 3 and Sept 25 @ 10 in office    Therapy Modalities   Building Insight, Cognitive Behavioral, Homework / Exercises, and Stress Management    Assessment / Plan     Psychotherapy Target Symptoms:  anxiety    Assessment:  Improving:   This patient reports she is taking her prescribed medication of Lexapro and her psychiatrist also added Fluoxetine.  She feels that the Lexapro has really helped with her anxiety.  As she hasn't been on her Fluoxetine but a few weeks, she has not noticed much of a change.  The patient admits that her OCD traits fall under her health-fears of disease, cancer, etc and what has helped are the numerous tests that she has undergone to validate she is healthy.  She still has the thoughts and sometimes follows up with actions/behaviors.  For example, when coming to a doctor's appt, she will usually isolate herself for a few days as well as take a shower upon her return from the office.  Today she was able to talk through her irrational thoughts re: germs, exposure, etc. And realized she did not really need to take a shower when she got home.  Her insight is helping and although she will have the thoughts, they are less intense as well as not always following up with the behaviors from that thought.  NO acute symptoms are reported at this time.  The patient is going on vacation next week.    Plan:  continue psychotherapy    1. Anxiety  2. Mixed obsessional thoughts and acts       --Corazon Zelaya LCSW on 8/14/2024 at 12:37 PM    An electronic signature was used to authenticate this note.

## 2024-08-14 NOTE — PSYCHOTHERAPY
In office -Follow Up    Time Start:10:00 am  Time End:11:00 am    DX:    Diagnosis Orders   1. Anxiety        2. Mixed obsessional thoughts and acts             Met with Ms. Cuellar today for our follow up appt.  This patient reports she is taking her prescribed medication of Lexapro and her psychiatrist also added Fluoxetine.  She feels that the Lexapro has really helped with her anxiety.  As she hasn't been on her Fluoxetine but a few weeks, she has not noticed much of a change.  The patient admits that her OCD traits fall under her health-fears of disease, cancer, etc and what has helped are the numerous tests that she has undergone to validate she is healthy.  She still has the thoughts and sometimes follows up with actions/behaviors.  For example, when coming to a doctor's appt, she will usually isolate herself for a few days as well as take a shower upon her return from the office.  Today she was able to talk through her irrational thoughts re: germs, exposure, etc. And realized she did not really need to take a shower when she got home.  Her insight is helping and although she will have the thoughts, they are less intense as well as not always following up with the behaviors from that thought.  NO acute symptoms are reported at this time.  The patient is going on vacation next week.       We did set a follow-up appointment with this clinician.      Follow-up appt date (if applicable) is:  sept 11 @ 3 and sept 25 @ 10 in office    Corazon Zelaya LCSW

## 2024-09-11 ENCOUNTER — OFFICE VISIT (OUTPATIENT)
Age: 39
End: 2024-09-11
Payer: COMMERCIAL

## 2024-09-11 DIAGNOSIS — F42.2 MIXED OBSESSIONAL THOUGHTS AND ACTS: ICD-10-CM

## 2024-09-11 DIAGNOSIS — F41.9 ANXIETY: Primary | ICD-10-CM

## 2024-09-11 PROCEDURE — 90834 PSYTX W PT 45 MINUTES: CPT | Performed by: SOCIAL WORKER

## 2024-10-21 ENCOUNTER — TELEPHONE (OUTPATIENT)
Age: 39
End: 2024-10-21

## 2024-10-21 NOTE — TELEPHONE ENCOUNTER
Sent pt's last Office Visit and Last Lab's to [facility] I routed the record's electronically thru the [employer].Record request has been scanned into Media.

## 2024-11-23 ENCOUNTER — OFFICE VISIT (OUTPATIENT)
Age: 39
End: 2024-11-23

## 2024-11-23 VITALS
HEART RATE: 95 BPM | TEMPERATURE: 98.7 F | DIASTOLIC BLOOD PRESSURE: 84 MMHG | RESPIRATION RATE: 16 BRPM | BODY MASS INDEX: 36.19 KG/M2 | SYSTOLIC BLOOD PRESSURE: 124 MMHG | HEIGHT: 65 IN | OXYGEN SATURATION: 97 % | WEIGHT: 217.2 LBS

## 2024-11-23 DIAGNOSIS — J06.9 VIRAL UPPER RESPIRATORY ILLNESS: Primary | ICD-10-CM

## 2024-11-23 DIAGNOSIS — J02.9 SORE THROAT: ICD-10-CM

## 2024-11-23 DIAGNOSIS — R52 BODY ACHES: ICD-10-CM

## 2024-11-23 PROBLEM — N76.0 VAGINITIS: Status: ACTIVE | Noted: 2023-11-08

## 2024-11-23 PROBLEM — K21.9 GERD (GASTROESOPHAGEAL REFLUX DISEASE): Status: ACTIVE | Noted: 2021-03-01

## 2024-11-23 PROBLEM — R12 HEARTBURN: Status: ACTIVE | Noted: 2024-11-23

## 2024-11-23 LAB
GROUP A STREP ANTIGEN, POC: NEGATIVE
INFLUENZA A ANTIGEN, POC: NEGATIVE
INFLUENZA B ANTIGEN, POC: NEGATIVE
VALID INTERNAL CONTROL, POC: NORMAL
VALID INTERNAL CONTROL, POC: NORMAL

## 2024-11-23 RX ORDER — PANTOPRAZOLE SODIUM 40 MG/1
TABLET, DELAYED RELEASE ORAL
COMMUNITY
Start: 2024-11-01

## 2024-11-23 NOTE — PROGRESS NOTES
End Date Taking? Authorizing Provider   pantoprazole (PROTONIX) 40 MG tablet TAKE 1 TABLET BY MOUTH DAILY, 30 MINUTES BEFORE BREAKFAST 11/1/24  Yes Ashish Bello MD   methIMAzole (TAPAZOLE) 10 MG tablet Take 0.5 tablets by mouth daily 8/13/24   Chitra Genao MD   fluvoxaMINE (LUVOX) 50 MG tablet Take 0.5 tablets by mouth nightly    Ashish Bello MD   escitalopram (LEXAPRO) 10 MG tablet Take 1 tablet by mouth daily 8/8/24   Bisi Ruffin APRN - NP   ferrous sulfate (IRON 325) 325 (65 Fe) MG tablet Take 1 tablet by mouth daily (with breakfast)    Provider, MD Ashish        Objective   Physical Exam  Vitals and nursing note reviewed.   Constitutional:       Appearance: Normal appearance.   HENT:      Head: Normocephalic and atraumatic.      Right Ear: Tympanic membrane, ear canal and external ear normal.      Left Ear: Tympanic membrane, ear canal and external ear normal.      Nose: Congestion present. No rhinorrhea.      Mouth/Throat:      Mouth: Mucous membranes are moist.      Pharynx: Oropharynx is clear. Posterior oropharyngeal erythema present. No oropharyngeal exudate.   Eyes:      Conjunctiva/sclera: Conjunctivae normal.   Cardiovascular:      Rate and Rhythm: Normal rate and regular rhythm.      Heart sounds: Normal heart sounds. No murmur heard.  Pulmonary:      Effort: Pulmonary effort is normal. No respiratory distress.      Breath sounds: Normal breath sounds. No stridor. No wheezing, rhonchi or rales.   Musculoskeletal:      Cervical back: Normal range of motion and neck supple.   Lymphadenopathy:      Cervical: No cervical adenopathy.   Skin:     General: Skin is warm and dry.   Neurological:      Mental Status: She is alert and oriented to person, place, and time.   Psychiatric:         Mood and Affect: Mood normal.         Behavior: Behavior normal.          Results for orders placed or performed in visit on 11/23/24   AMB POC INFLUENZA A  AND B REAL-TIME RT-PCR   Result

## 2024-12-03 LAB
ALBUMIN SERPL-MCNC: 4 G/DL (ref 3.9–4.9)
ALP SERPL-CCNC: 86 IU/L (ref 44–121)
ALT SERPL-CCNC: 10 IU/L (ref 0–32)
AST SERPL-CCNC: 14 IU/L (ref 0–40)
BASOPHILS # BLD AUTO: 0.1 X10E3/UL (ref 0–0.2)
BASOPHILS NFR BLD AUTO: 1 %
BILIRUB DIRECT SERPL-MCNC: <0.08 MG/DL (ref 0–0.4)
BILIRUB SERPL-MCNC: <0.2 MG/DL (ref 0–1.2)
EOSINOPHIL # BLD AUTO: 0.2 X10E3/UL (ref 0–0.4)
EOSINOPHIL NFR BLD AUTO: 2 %
ERYTHROCYTE [DISTWIDTH] IN BLOOD BY AUTOMATED COUNT: 13.2 % (ref 11.7–15.4)
HCT VFR BLD AUTO: 37.8 % (ref 34–46.6)
HGB BLD-MCNC: 11.3 G/DL (ref 11.1–15.9)
IMM GRANULOCYTES # BLD AUTO: 0 X10E3/UL (ref 0–0.1)
IMM GRANULOCYTES NFR BLD AUTO: 0 %
LYMPHOCYTES # BLD AUTO: 2.2 X10E3/UL (ref 0.7–3.1)
LYMPHOCYTES NFR BLD AUTO: 32 %
MCH RBC QN AUTO: 25.2 PG (ref 26.6–33)
MCHC RBC AUTO-ENTMCNC: 29.9 G/DL (ref 31.5–35.7)
MCV RBC AUTO: 84 FL (ref 79–97)
MONOCYTES # BLD AUTO: 0.4 X10E3/UL (ref 0.1–0.9)
MONOCYTES NFR BLD AUTO: 7 %
NEUTROPHILS # BLD AUTO: 4 X10E3/UL (ref 1.4–7)
NEUTROPHILS NFR BLD AUTO: 58 %
PLATELET # BLD AUTO: 361 X10E3/UL (ref 150–450)
PROT SERPL-MCNC: 7 G/DL (ref 6–8.5)
RBC # BLD AUTO: 4.49 X10E6/UL (ref 3.77–5.28)
SPECIMEN STATUS REPORT: NORMAL
T3 SERPL-MCNC: 151 NG/DL (ref 71–180)
T4 FREE SERPL-MCNC: 1.03 NG/DL (ref 0.82–1.77)
TSH SERPL DL<=0.005 MIU/L-ACNC: 3.9 UIU/ML (ref 0.45–4.5)
TSI SER-ACNC: 0.78 IU/L (ref 0–0.55)
WBC # BLD AUTO: 6.8 X10E3/UL (ref 3.4–10.8)

## 2024-12-05 ENCOUNTER — TELEMEDICINE (OUTPATIENT)
Age: 39
End: 2024-12-05

## 2024-12-05 DIAGNOSIS — E05.00 GRAVES DISEASE: Primary | ICD-10-CM

## 2024-12-05 RX ORDER — METHIMAZOLE 5 MG/1
2.5 TABLET ORAL DAILY
Qty: 30 TABLET | Refills: 11 | Status: SHIPPED | OUTPATIENT
Start: 2024-12-05 | End: 2025-01-04

## 2024-12-05 NOTE — PROGRESS NOTES
Chief Complaint   Patient presents with    Thyroid Problem     History of Present Illness: Kianna Cuellar is a 38 y.o. female seen for discussion related to Graves' disease.    Received radioactive iodine in 2006, 2 years.  Reports that symptoms never resolved.  Does take daily iron, menstrual periods became much heavier after the birth of her daughter in 2005.  Half sister also has hypERthyroidism.  Has noted palpitations, tremor    08/13/2024: Thinking perhaps she never received I131, maybe I123- took MMI for about 6 months.    12/05/2024: Currently taking half of the 10 mg tablet daily of methimazole.  Weight currently is 217 pounds, baseline is about 150.    Current Outpatient Medications   Medication Sig    methIMAzole (TAPAZOLE) 5 MG tablet Take 0.5 tablets by mouth daily    pantoprazole (PROTONIX) 40 MG tablet TAKE 1 TABLET BY MOUTH DAILY, 30 MINUTES BEFORE BREAKFAST    fluvoxaMINE (LUVOX) 50 MG tablet Take 0.5 tablets by mouth nightly    escitalopram (LEXAPRO) 10 MG tablet Take 1 tablet by mouth daily    ferrous sulfate (IRON 325) 325 (65 Fe) MG tablet Take 1 tablet by mouth daily (with breakfast)     No current facility-administered medications for this visit.       Social Hx: 19-year-old daughter at school in [place]  Seldom alcohol, non-smoker    Review of Systems:  See HPI    Physical Examination:  There were no vitals taken for this visit.    - GENERAL: NCAT, Appears hyperthyroid  - EYES: Prominent stare  - Ear/Nose/Throat: Does have thyromegaly  - CARDIOVASCULAR: no cyanosis, no visible JVD   - RESPIRATORY: respiratory effort normal without any distress or labored breathing   - MUSCULOSKELETAL: Normal ROM of neck and upper extremities observed   - SKIN: No rash on face  - NEUROLOGIC: Low amplitude high-frequency tremor with outstretched hands  - PSYCHIATRIC: Normal affect, Normal insight and judgement     Data Reviewed:    Latest Reference Range & Units 11/06/23 09:59 02/12/24 09:32 05/16/24 07:15

## 2025-02-10 ENCOUNTER — OFFICE VISIT (OUTPATIENT)
Age: 40
End: 2025-02-10
Payer: COMMERCIAL

## 2025-02-10 VITALS
TEMPERATURE: 97.6 F | RESPIRATION RATE: 16 BRPM | OXYGEN SATURATION: 98 % | SYSTOLIC BLOOD PRESSURE: 112 MMHG | WEIGHT: 221.8 LBS | DIASTOLIC BLOOD PRESSURE: 77 MMHG | BODY MASS INDEX: 36.96 KG/M2 | HEIGHT: 65 IN | HEART RATE: 73 BPM

## 2025-02-10 DIAGNOSIS — F41.1 GAD (GENERALIZED ANXIETY DISORDER): Primary | ICD-10-CM

## 2025-02-10 DIAGNOSIS — F42.9 OBSESSIVE-COMPULSIVE DISORDER, UNSPECIFIED TYPE: ICD-10-CM

## 2025-02-10 DIAGNOSIS — K21.9 GASTROESOPHAGEAL REFLUX DISEASE WITHOUT ESOPHAGITIS: ICD-10-CM

## 2025-02-10 DIAGNOSIS — E05.90 HYPERTHYROIDISM: ICD-10-CM

## 2025-02-10 DIAGNOSIS — D50.9 IRON DEFICIENCY ANEMIA, UNSPECIFIED IRON DEFICIENCY ANEMIA TYPE: ICD-10-CM

## 2025-02-10 PROCEDURE — 99214 OFFICE O/P EST MOD 30 MIN: CPT | Performed by: NURSE PRACTITIONER

## 2025-02-10 RX ORDER — METHIMAZOLE 5 MG/1
2.5 TABLET ORAL
COMMUNITY

## 2025-02-10 SDOH — ECONOMIC STABILITY: TRANSPORTATION INSECURITY
IN THE PAST 12 MONTHS, HAS THE LACK OF TRANSPORTATION KEPT YOU FROM MEDICAL APPOINTMENTS OR FROM GETTING MEDICATIONS?: NO

## 2025-02-10 SDOH — ECONOMIC STABILITY: FOOD INSECURITY: WITHIN THE PAST 12 MONTHS, YOU WORRIED THAT YOUR FOOD WOULD RUN OUT BEFORE YOU GOT MONEY TO BUY MORE.: NEVER TRUE

## 2025-02-10 SDOH — ECONOMIC STABILITY: INCOME INSECURITY: IN THE LAST 12 MONTHS, WAS THERE A TIME WHEN YOU WERE NOT ABLE TO PAY THE MORTGAGE OR RENT ON TIME?: NO

## 2025-02-10 SDOH — ECONOMIC STABILITY: FOOD INSECURITY: WITHIN THE PAST 12 MONTHS, THE FOOD YOU BOUGHT JUST DIDN'T LAST AND YOU DIDN'T HAVE MONEY TO GET MORE.: NEVER TRUE

## 2025-02-10 SDOH — ECONOMIC STABILITY: TRANSPORTATION INSECURITY
IN THE PAST 12 MONTHS, HAS LACK OF TRANSPORTATION KEPT YOU FROM MEETINGS, WORK, OR FROM GETTING THINGS NEEDED FOR DAILY LIVING?: NO

## 2025-02-10 ASSESSMENT — PATIENT HEALTH QUESTIONNAIRE - PHQ9
SUM OF ALL RESPONSES TO PHQ QUESTIONS 1-9: 0
SUM OF ALL RESPONSES TO PHQ QUESTIONS 1-9: 0
1. LITTLE INTEREST OR PLEASURE IN DOING THINGS: NOT AT ALL
SUM OF ALL RESPONSES TO PHQ QUESTIONS 1-9: 0
2. FEELING DOWN, DEPRESSED OR HOPELESS: NOT AT ALL
SUM OF ALL RESPONSES TO PHQ QUESTIONS 1-9: 0
SUM OF ALL RESPONSES TO PHQ9 QUESTIONS 1 & 2: 0

## 2025-02-10 ASSESSMENT — ENCOUNTER SYMPTOMS
CHEST TIGHTNESS: 0
SHORTNESS OF BREATH: 0

## 2025-02-10 NOTE — PROGRESS NOTES
Chief Complaint   Patient presents with    6 Month Follow-Up     \"Have you been to the ER, urgent care clinic since your last visit?  Hospitalized since your last visit?\"    YES - When: approximately 3 months ago.  Where and Why: Urgent Care.    “Have you seen or consulted any other health care providers outside of [facility] since your last visit?”    YES - When: approximately 3 months ago.  Where and Why: Dr. Mitch Meeks.

## 2025-02-10 NOTE — PROGRESS NOTES
Subjective:      Patient ID: Kianna Cuellar is a 39 y.o. female     HPI  Follow up health problems.   GLORIA.  Has been more consistent with taking iron supplement.  Last labs stable.  Continues to have heavy monthly menses.   GERD/intestinal metaplasia.  Taking pantoprazole daily with good symptom control, followed by GI.  Hyperthyroid.  Followed by Dr. Genao.  Has recently resumed methimazole.  Has occasional heart palpitations, non sustained.  Recently had labs done by Dr. Genao, reviewed.   LADONNA/OCD followed by psychiatry.  Fluvoxamine recently added to lexapro for OCD.      Patient Active Problem List   Diagnosis    GERD (gastroesophageal reflux disease)    Hyperthyroidism    Iron deficiency anemia    LADONNA (generalized anxiety disorder)    Heart palpitations    Intestinal metaplasia of gastric mucosa    Obsessive-compulsive disorder    Heartburn    Vaginitis     Current Outpatient Medications   Medication Sig    methIMAzole (TAPAZOLE) 5 MG tablet Take 0.5 tablets by mouth    pantoprazole (PROTONIX) 40 MG tablet TAKE 1 TABLET BY MOUTH DAILY, 30 MINUTES BEFORE BREAKFAST    fluvoxaMINE (LUVOX) 50 MG tablet Take 0.5 tablets by mouth nightly    escitalopram (LEXAPRO) 10 MG tablet Take 1 tablet by mouth daily    ferrous sulfate (IRON 325) 325 (65 Fe) MG tablet Take 1 tablet by mouth daily (with breakfast)     No current facility-administered medications for this visit.     Social History     Tobacco Use    Smoking status: Never    Smokeless tobacco: Never   Vaping Use    Vaping status: Never Used   Substance Use Topics    Alcohol use: Never    Drug use: Never     Blood pressure 112/77, pulse 73, temperature 97.6 °F (36.4 °C), resp. rate 16, height 1.651 m (5' 5\"), weight 100.6 kg (221 lb 12.8 oz), last menstrual period 02/04/2025, SpO2 98%.    Review of Systems   Respiratory:  Negative for chest tightness and shortness of breath.    Cardiovascular:  Positive for palpitations (occasional). Negative for chest pain and leg

## 2025-03-06 ENCOUNTER — ANESTHESIA EVENT (OUTPATIENT)
Facility: HOSPITAL | Age: 40
End: 2025-03-06
Payer: COMMERCIAL

## 2025-03-06 NOTE — ANESTHESIA PRE PROCEDURE
\"PROTIME\", \"INR\", \"APTT\"    HCG (If Applicable): No results found for: \"PREGTESTUR\", \"PREGSERUM\", \"HCG\", \"HCGQUANT\"     ABGs: No results found for: \"PHART\", \"PO2ART\", \"TQY2NZS\", \"RCO8VRA\", \"BEART\", \"J4MUGFEP\"     Type & Screen (If Applicable):  No results found for: \"ABORH\", \"LABANTI\"    Drug/Infectious Status (If Applicable):  No results found for: \"HIV\", \"HEPCAB\"    COVID-19 Screening (If Applicable): No results found for: \"COVID19\"        Anesthesia Evaluation  Patient summary reviewed and Nursing notes reviewed  Airway: Mallampati: II  TM distance: <3 FB   Neck ROM: full  Mouth opening: > = 3 FB   Dental: normal exam         Pulmonary:Negative Pulmonary ROS breath sounds clear to auscultation                             Cardiovascular:Negative CV ROS            Rhythm: regular  Rate: normal                    Neuro/Psych:   Negative Neuro/Psych ROS  (+) psychiatric history:            GI/Hepatic/Renal:   (+) GERD:          Endo/Other: Negative Endo/Other ROS                    Abdominal:             Vascular: negative vascular ROS.         Other Findings:             Anesthesia Plan      MAC     ASA 2       Induction: intravenous.      Anesthetic plan and risks discussed with patient.      Plan discussed with CRNA.                    Bishnu Monterroso MD   3/6/2025

## 2025-03-07 ENCOUNTER — ANESTHESIA (OUTPATIENT)
Facility: HOSPITAL | Age: 40
End: 2025-03-07
Payer: COMMERCIAL

## 2025-03-07 ENCOUNTER — HOSPITAL ENCOUNTER (OUTPATIENT)
Facility: HOSPITAL | Age: 40
Setting detail: OUTPATIENT SURGERY
Discharge: HOME | End: 2025-03-07
Attending: INTERNAL MEDICINE | Admitting: INTERNAL MEDICINE
Payer: COMMERCIAL

## 2025-03-07 VITALS
DIASTOLIC BLOOD PRESSURE: 80 MMHG | RESPIRATION RATE: 23 BRPM | HEIGHT: 65 IN | OXYGEN SATURATION: 94 % | BODY MASS INDEX: 36.67 KG/M2 | TEMPERATURE: 98 F | WEIGHT: 220.1 LBS | SYSTOLIC BLOOD PRESSURE: 135 MMHG | HEART RATE: 79 BPM

## 2025-03-07 LAB — HCG UR QL: NEGATIVE

## 2025-03-07 PROCEDURE — 6360000002 HC RX W HCPCS: Performed by: NURSE ANESTHETIST, CERTIFIED REGISTERED

## 2025-03-07 PROCEDURE — 2580000003 HC RX 258: Performed by: INTERNAL MEDICINE

## 2025-03-07 PROCEDURE — 2709999900 HC NON-CHARGEABLE SUPPLY: Performed by: INTERNAL MEDICINE

## 2025-03-07 PROCEDURE — 88305 TISSUE EXAM BY PATHOLOGIST: CPT

## 2025-03-07 PROCEDURE — 2720000010 HC SURG SUPPLY STERILE: Performed by: INTERNAL MEDICINE

## 2025-03-07 PROCEDURE — 3700000000 HC ANESTHESIA ATTENDED CARE: Performed by: INTERNAL MEDICINE

## 2025-03-07 PROCEDURE — 7100000011 HC PHASE II RECOVERY - ADDTL 15 MIN: Performed by: INTERNAL MEDICINE

## 2025-03-07 PROCEDURE — 3700000001 HC ADD 15 MINUTES (ANESTHESIA): Performed by: INTERNAL MEDICINE

## 2025-03-07 PROCEDURE — 7100000010 HC PHASE II RECOVERY - FIRST 15 MIN: Performed by: INTERNAL MEDICINE

## 2025-03-07 PROCEDURE — 3600007512: Performed by: INTERNAL MEDICINE

## 2025-03-07 PROCEDURE — 81025 URINE PREGNANCY TEST: CPT

## 2025-03-07 PROCEDURE — 3600007502: Performed by: INTERNAL MEDICINE

## 2025-03-07 RX ORDER — SODIUM CHLORIDE 0.9 % (FLUSH) 0.9 %
5-40 SYRINGE (ML) INJECTION EVERY 12 HOURS SCHEDULED
Status: DISCONTINUED | OUTPATIENT
Start: 2025-03-07 | End: 2025-03-07 | Stop reason: HOSPADM

## 2025-03-07 RX ORDER — SODIUM CHLORIDE 9 MG/ML
INJECTION, SOLUTION INTRAVENOUS CONTINUOUS
Status: DISCONTINUED | OUTPATIENT
Start: 2025-03-07 | End: 2025-03-07 | Stop reason: HOSPADM

## 2025-03-07 RX ORDER — SODIUM CHLORIDE 9 MG/ML
INJECTION, SOLUTION INTRAVENOUS PRN
Status: DISCONTINUED | OUTPATIENT
Start: 2025-03-07 | End: 2025-03-07 | Stop reason: HOSPADM

## 2025-03-07 RX ORDER — SODIUM CHLORIDE 0.9 % (FLUSH) 0.9 %
5-40 SYRINGE (ML) INJECTION PRN
Status: DISCONTINUED | OUTPATIENT
Start: 2025-03-07 | End: 2025-03-07 | Stop reason: HOSPADM

## 2025-03-07 RX ADMIN — SODIUM CHLORIDE: 9 INJECTION, SOLUTION INTRAVENOUS at 08:18

## 2025-03-07 RX ADMIN — PROPOFOL 100 MG: 10 INJECTION, EMULSION INTRAVENOUS at 08:22

## 2025-03-07 RX ADMIN — PROPOFOL 50 MG: 10 INJECTION, EMULSION INTRAVENOUS at 08:25

## 2025-03-07 RX ADMIN — PROPOFOL 50 MG: 10 INJECTION, EMULSION INTRAVENOUS at 08:29

## 2025-03-07 RX ADMIN — PROPOFOL 50 MG: 10 INJECTION, EMULSION INTRAVENOUS at 08:33

## 2025-03-07 NOTE — ANESTHESIA POSTPROCEDURE EVALUATION
Department of Anesthesiology  Postprocedure Note    Patient: Kianna Cuellar  MRN: [id number]  YOB: 1985  Date of evaluation: 3/7/2025    Procedure Summary       Date: 03/07/25 Room / Location: [facility] ENDO 02 / [facility] ENDOSCOPY    Anesthesia Start: 0818 Anesthesia Stop: 0845    Procedure: ESOPHAGOGASTRODUODENOSCOPY (Upper GI Region) Diagnosis:       Intestinal metaplasia of gastric mucosa      (Intestinal metaplasia of gastric mucosa [K31.A0])    Surgeons: Mitch Meeks MD Responsible Provider: Bishnu Monterroso MD    Anesthesia Type: MAC ASA Status: 2            Anesthesia Type: MAC    Richard Phase I: Richard Score: 10    Richard Phase II: Richard Score: 5    Anesthesia Post Evaluation    Patient location during evaluation: PACU  Patient participation: complete - patient participated  Level of consciousness: awake  Airway patency: patent  Nausea & Vomiting: no nausea  Cardiovascular status: hemodynamically stable  Respiratory status: acceptable  Hydration status: stable  Pain management: adequate    No notable events documented.

## 2025-03-07 NOTE — H&P
[address] [address]          Pre-procedure History and Physical       NAME:  Kianna Cuellar   :   1985   MRN:   [id number]     CHIEF COMPLAINT/HPI: Intestinal metaplasia of the stomach    PMH:  Past Medical History:   Diagnosis Date    GERD (gastroesophageal reflux disease) 3/2021    Hyperthyroidism 2006       PSH:  Past Surgical History:   Procedure Laterality Date    CHOLECYSTECTOMY  2022    UPPER GASTROINTESTINAL ENDOSCOPY  2023       Allergies:  No Known Allergies    Home Medications:  Prior to Admission Medications   Prescriptions Last Dose Informant Patient Reported? Taking?   escitalopram (LEXAPRO) 10 MG tablet 3/6/2025  No Yes   Sig: Take 1 tablet by mouth daily   ferrous sulfate (IRON 325) 325 (65 Fe) MG tablet 3/6/2025  Yes Yes   Sig: Take 1 tablet by mouth daily (with breakfast)   fluvoxaMINE (LUVOX) 50 MG tablet Past Week  Yes Yes   Sig: Take 0.5 tablets by mouth nightly   methIMAzole (TAPAZOLE) 5 MG tablet 3/6/2025  Yes Yes   Sig: Take 0.5 tablets by mouth   pantoprazole (PROTONIX) 40 MG tablet 3/6/2025  Yes Yes   Sig: TAKE 1 TABLET BY MOUTH DAILY, 30 MINUTES BEFORE BREAKFAST      Facility-Administered Medications: None       Hospital Medications:  Current Facility-Administered Medications   Medication Dose Route Frequency    0.9 % sodium chloride infusion   IntraVENous Continuous    sodium chloride flush 0.9 % injection 5-40 mL  5-40 mL IntraVENous 2 times per day    sodium chloride flush 0.9 % injection 5-40 mL  5-40 mL IntraVENous PRN    0.9 % sodium chloride infusion   IntraVENous PRN       Family History:  Family History   Problem Relation Age of Onset    High Blood Pressure Mother     Cancer Father         Kidney    Colon Cancer Maternal Grandmother     High Blood Pressure Maternal Grandmother     Stroke Maternal Grandmother     Colon Cancer Maternal Aunt        Social History:  Social History     Tobacco Use    Smoking status: Never    Smokeless  tobacco: Never   Substance Use Topics    Alcohol use: Never         PHYSICAL EXAM PRIOR TO SEDATION:  General: Alert, in no acute distress    Lungs:            CTA bilaterally  Heart:  Normal S1, S2    Abdomen: Soft, Non distended, Non tender.  Normoactive bowel sounds.      Assessment:   Stable for sedation administration.    Plan:     Endoscopic procedure with sedation     Signed By: Mitch Meeks MD     3/7/2025  8:18 AM

## 2025-03-07 NOTE — DISCHARGE INSTRUCTIONS
[facility]  Mitch Meeks M.D.  [address], [place] [address] [phone]         EGD DISCHARGE INSTRUCTIONS      Kianna Cuellar  [id number]  1985    DISCOMFORT:  Sore throat- throat lozenges or warm salt water gargle  Redness at IV site- apply warm compress to area; if redness or soreness persist- contact your physician  Gaseous discomfort- walking, belching will help relieve any discomfort  You may not operate a vehicle for 12 hours  You may not engage in an occupation involving machinery or appliances for the  rest of today  You may not drink alcoholic beverages for at least 12 hours  Avoid making any critical decisions for at least 24 hours    DIET:   You may resume your normal diet, but some patients find that heavy or large  meals may lead to indigestion or vomiting. I suggest a light meal as first food  Intake. I recommend a whole food, plant-based diet for your overall health.    ACTIVITY:  You may resume your normal daily activities.  It is recommended that you spend the remainder of the day resting - avoid any strenuous activity.    CALL M.D. IF ANY SIGN OF:   Increasing pain, nausea, vomiting  Abdominal distension (swelling)  Significant bleeding (oral or rectal)  Fever   Pain in chest area  Shortness of breath    Additional Instructions:   Call Dr. Meeks if any questions or problems at [phone]   You should receive the biopsy results by phone or mail within 3 weeks, if not, call my office for the results  EGD showed small hiatal hernia, gastric polyps, mild redness in the stomach which could be acid related.  Biopsies taken throughout.  Repeat EGD in 3 years.

## 2025-03-07 NOTE — OP NOTE
DYL[facility] Meeks M.D.  [phone]               Esophagogastroduodenoscopy (EGD) Procedure Note    NAME: Kianna Cuellar  :  1985  MRN:  [id number]    Indications: Intestinal metaplasia of the stomach    : Mitch Meeks MD    Referring Provider:  Bisi Ruffin APRN - NP    Staff: Circulator: Torri Baker RN  Circulator Assist: Leelee Callahan RN  Endoscopy Technician: David Cramer    Prosthetic devices, grafts, tissues, transplant, or devices implanted: none    Medicine:  MAC anesthesia      Procedure Details:  After informed consent was obtained with all risks and benefits of the procedure explained and preprocedure exam completed, the patient was placed in the left lateral decubitus position.  Universal protocol for patient identification was performed and documented in the nursing notes.  Throughout the procedure, the patient's blood pressure was monitored at least every five minutes; pulse, and oxygen saturations were monitored continuously.  All vital signs were documented in the nursing notes.  The endoscope was inserted into the mouth and advanced under direct vision to second portion of the duodenum.  A careful inspection was made as the gastroscope was withdrawn, including a retroflexed view of the proximal stomach; findings and interventions are described below.      Findings:   Esophagus:normal  Stomach: 3 mm benign-appearing sessile polyp distal to the GE junction (probable fundic gland polyp, not associated with Schwarz's esophagus, it does not appear to be a nodule) status post biopsies and its own jar, several sessile polyps with greatest diameter of 4 mm in the fundus and body status post biopsies, mild linear erythema in the antrum status post biopsies there and throughout the stomach, 2 cm hiatal hernia  Duodenum: normal    Interventions:    biopsy of stomach  whole    Specimens:   ID Type Source Tests Collected by Time Destination   1 : Distal stomach,  rule out intestinal metaplasia and dysplasia Tissue Stomach SURGICAL PATHOLOGY Mitch Meeks MD 3/7/2025 0828    2 : proximal stomach, rule out intestinal metaplasia/dysplasia Tissue Stomach SURGICAL PATHOLOGY Mitch Meeks MD 3/7/2025 0832    3 : gastric polyps biopsy Tissue Stomach SURGICAL PATHOLOGY Mitch Meeks MD 3/7/2025 0833    4 : GE JX BX rule out adenoma Tissue GE Junction Biopsy SURGICAL PATHOLOGY Mitch Meeks MD 3/7/2025 0835         EBL: None          Complications:     No immediate complications        Impression:  -See above    Recommendations:  -Await pathology.  -Repeat EGD in 3 years at [facility]    Signed by: Mitch Meeks MD         3/7/2025 8:39 AM

## 2025-03-07 NOTE — PROGRESS NOTES

## 2025-03-18 ENCOUNTER — OFFICE VISIT (OUTPATIENT)
Age: 40
End: 2025-03-18

## 2025-03-18 VITALS
OXYGEN SATURATION: 96 % | TEMPERATURE: 98.5 F | HEART RATE: 90 BPM | DIASTOLIC BLOOD PRESSURE: 84 MMHG | SYSTOLIC BLOOD PRESSURE: 127 MMHG | BODY MASS INDEX: 37.11 KG/M2 | RESPIRATION RATE: 18 BRPM | WEIGHT: 223 LBS

## 2025-03-18 DIAGNOSIS — U07.1 COVID: Primary | ICD-10-CM

## 2025-03-18 LAB
INFLUENZA A ANTIGEN, POC: NEGATIVE
INFLUENZA B ANTIGEN, POC: NEGATIVE
Lab: ABNORMAL
PERFORMING INSTRUMENT: ABNORMAL
QC PASS/FAIL: ABNORMAL
SARS-COV-2, POC: DETECTED

## 2025-03-18 ASSESSMENT — ENCOUNTER SYMPTOMS
SHORTNESS OF BREATH: 0
DIARRHEA: 0
NAUSEA: 0
VOMITING: 0
COUGH: 1
SORE THROAT: 0

## 2025-03-18 NOTE — PATIENT INSTRUCTIONS
Patient will continue symptomatic treatment with over-the-counter medications and follow-up as needed with PCP

## 2025-03-18 NOTE — PROGRESS NOTES
should prompt an immediate return to the urgent care or emergency department.  Patient/Guardian expressed understanding and agrees with the discharge plan.  No further questions at time of discharge.    Sundeep Nguyen MD

## 2025-04-11 LAB
ALBUMIN SERPL-MCNC: 4.2 G/DL (ref 3.9–4.9)
ALP SERPL-CCNC: 87 IU/L (ref 44–121)
ALT SERPL-CCNC: 17 IU/L (ref 0–32)
AST SERPL-CCNC: 15 IU/L (ref 0–40)
BILIRUB DIRECT SERPL-MCNC: <0.08 MG/DL (ref 0–0.4)
BILIRUB SERPL-MCNC: <0.2 MG/DL (ref 0–1.2)
PROT SERPL-MCNC: 7.3 G/DL (ref 6–8.5)
T4 FREE SERPL-MCNC: 1.01 NG/DL (ref 0.82–1.77)
TSH SERPL DL<=0.005 MIU/L-ACNC: 3.71 UIU/ML (ref 0.45–4.5)
TSI SER-ACNC: 0.51 IU/L (ref 0–0.55)

## 2025-04-12 LAB — T3 SERPL-MCNC: 155 NG/DL (ref 71–180)

## 2025-04-16 ENCOUNTER — OFFICE VISIT (OUTPATIENT)
Age: 40
End: 2025-04-16
Payer: COMMERCIAL

## 2025-04-16 VITALS
WEIGHT: 222.4 LBS | BODY MASS INDEX: 37.05 KG/M2 | DIASTOLIC BLOOD PRESSURE: 74 MMHG | OXYGEN SATURATION: 99 % | HEIGHT: 65 IN | RESPIRATION RATE: 17 BRPM | HEART RATE: 66 BPM | SYSTOLIC BLOOD PRESSURE: 107 MMHG

## 2025-04-16 DIAGNOSIS — E61.1 IRON DEFICIENCY: ICD-10-CM

## 2025-04-16 DIAGNOSIS — E05.00 GRAVES DISEASE: Primary | ICD-10-CM

## 2025-04-16 LAB — HBA1C MFR BLD: 5.1 %

## 2025-04-16 PROCEDURE — 83036 HEMOGLOBIN GLYCOSYLATED A1C: CPT | Performed by: INTERNAL MEDICINE

## 2025-04-16 PROCEDURE — 99214 OFFICE O/P EST MOD 30 MIN: CPT | Performed by: INTERNAL MEDICINE

## 2025-04-16 PROCEDURE — G2211 COMPLEX E/M VISIT ADD ON: HCPCS | Performed by: INTERNAL MEDICINE

## 2025-04-16 RX ORDER — TIRZEPATIDE 2.5 MG/.5ML
2.5 INJECTION, SOLUTION SUBCUTANEOUS WEEKLY
Qty: 2 ML | Refills: 0 | Status: SHIPPED | OUTPATIENT
Start: 2025-04-16

## 2025-04-16 RX ORDER — ONDANSETRON 4 MG/1
4 TABLET, FILM COATED ORAL DAILY PRN
Qty: 30 TABLET | Refills: 0 | Status: SHIPPED | OUTPATIENT
Start: 2025-04-16

## 2025-04-16 ASSESSMENT — PATIENT HEALTH QUESTIONNAIRE - PHQ9
SUM OF ALL RESPONSES TO PHQ QUESTIONS 1-9: 0
2. FEELING DOWN, DEPRESSED OR HOPELESS: NOT AT ALL
1. LITTLE INTEREST OR PLEASURE IN DOING THINGS: NOT AT ALL
SUM OF ALL RESPONSES TO PHQ QUESTIONS 1-9: 0

## 2025-04-16 NOTE — PROGRESS NOTES
Chief Complaint   Patient presents with    Follow-up     Thyroid      History of Present Illness: Kianna Cuellar is a 39 y.o. female seen for discussion related to Graves' disease.    Received radioactive iodine in 2006, 2 years.  Reports that symptoms never resolved.  Does take daily iron, menstrual periods became much heavier after the birth of her daughter in 2005.  Half sister also has hypERthyroidism.  Has noted palpitations, tremor    08/13/2024: Thinking perhaps she never received I131, maybe I123- took MMI for about 6 months.    12/05/2024: Currently taking half of the 10 mg tablet daily of methimazole.  Weight currently is 217 pounds, baseline is about 150.    04/16/2025:   History of Present Illness  The patient presents for evaluation of weight management, Graves' disease, and acid reflux.    She reports a significant increase in weight, which she perceives as excessive despite maintaining an active lifestyle. She has been referred to a weight management clinic and is considering financial assistance from family members for potential weight loss medications. Concerns are expressed about the potential impact of these medications on her anxiety and heart palpitations. Her diet includes olive oil, occasional butter, and excludes cheese. She avoids processed foods, coffee, alcohol, and rarely uses ibuprofen, preferring Tylenol when necessary. She is not currently using any form of birth control.    She has been on a regimen of pantoprazole for several years, taken in the morning with a 30-minute interval before eating. Additionally, famotidine is taken at night as prescribed by her gastroenterologist. Reflux symptoms are predominantly experienced at night, with daytime relief.    Currently, she is on a half tablet of methimazole daily for her thyroid condition, which appears to be effectively managing her levels. No discomfort or noticeable symptoms are reported, except for a slight awareness of the condition.

## 2025-04-16 NOTE — PROGRESS NOTES
Kianna Cuellar is a 39 y.o. female    Chief Complaint   Patient presents with    Follow-up     Thyroid      Vitals:    04/16/25 1317   BP: 107/74   BP Site: Left Upper Arm   Patient Position: Sitting   Pulse: 66   Resp: 17   SpO2: 99%   Weight: 100.9 kg (222 lb 6.4 oz)   Height: 1.651 m (5' 5\")         Health Maintenance Due   Topic Date Due    Varicella vaccine (1 of 2 - 13+ 2-dose series) Never done    Hepatitis B vaccine (1 of 3 - 19+ 3-dose series) Never done    DTaP/Tdap/Td vaccine (1 - Tdap) Never done    Diabetes screen  Never done         \"Have you been to the ER, urgent care clinic since your last visit?  Hospitalized since your last visit?\"    NO    “Have you seen or consulted any other health care providers outside of [facility] since your last visit?”    NO

## 2025-05-02 DIAGNOSIS — F41.1 GAD (GENERALIZED ANXIETY DISORDER): ICD-10-CM

## 2025-05-02 RX ORDER — ESCITALOPRAM OXALATE 10 MG/1
10 TABLET ORAL DAILY
Qty: 90 TABLET | Refills: 1 | OUTPATIENT
Start: 2025-05-02

## 2025-05-02 NOTE — TELEPHONE ENCOUNTER
Last appointment: 2/10/25 [place]  Next appointment: 7/14/25 [place]  Previous refill encounter(s): 8/8/24 90 + 1    Requested Prescriptions     Pending Prescriptions Disp Refills    escitalopram (LEXAPRO) 10 MG tablet [Pharmacy Med Name: ESCITALOPRAM 10 MG TABLET] 90 tablet 1     Sig: Take 1 tablet by mouth daily     For Pharmacy Admin Tracking Only    Program: Medication Refill  CPA in place:    Recommendation Provided To:   Intervention Detail: New Rx: 1, reason: Patient Preference  Intervention Accepted By:   Gap Closed?:    Time Spent (min): 5

## 2025-05-12 DIAGNOSIS — F41.1 GAD (GENERALIZED ANXIETY DISORDER): ICD-10-CM

## 2025-05-12 RX ORDER — ESCITALOPRAM OXALATE 10 MG/1
10 TABLET ORAL DAILY
Qty: 30 TABLET | Refills: 0 | Status: SHIPPED | OUTPATIENT
Start: 2025-05-12

## 2025-05-12 NOTE — TELEPHONE ENCOUNTER
Spoke to patient- she stated the psychiatrist has not prescribed the lexapro- only the OCD meds.  Asking if you will continue the lexapro.  Thanks, Nallely    Requested Prescriptions     Pending Prescriptions Disp Refills    escitalopram (LEXAPRO) 10 MG tablet 90 tablet 1     Sig: Take 1 tablet by mouth daily

## 2025-05-12 NOTE — TELEPHONE ENCOUNTER
RAJIV Ruffin,    Patient call- requesting refill of escitalopram and was advised denied by pharmacy.  .    Patient stated not sure why denied?  Refusal of \"other\".      Please refill or advise? Another provider? Thanks, Nallely    Last appointment: 2/10/25 [place]  Next appointment: 7/14/25 [place]  Previous refill encounter(s): 8/8/24 90 + 1    Requested Prescriptions     Pending Prescriptions Disp Refills    escitalopram (LEXAPRO) 10 MG tablet 90 tablet 1     Sig: Take 1 tablet by mouth daily     For Pharmacy Admin Tracking Only    Program: Medication Refill  CPA in place:    Recommendation Provided To:   Intervention Detail: New Rx: 1, reason: Patient Preference  Intervention Accepted By:   Gap Closed?:    Time Spent (min): 5

## 2025-05-20 RX ORDER — TIRZEPATIDE 2.5 MG/.5ML
INJECTION, SOLUTION SUBCUTANEOUS
Qty: 2 ML | Refills: 0 | OUTPATIENT
Start: 2025-05-20

## 2025-06-13 RX ORDER — TIRZEPATIDE 5 MG/.5ML
INJECTION, SOLUTION SUBCUTANEOUS
Qty: 2 ML | Refills: 0 | OUTPATIENT
Start: 2025-06-13

## 2025-07-14 ENCOUNTER — OFFICE VISIT (OUTPATIENT)
Age: 40
End: 2025-07-14
Payer: COMMERCIAL

## 2025-07-14 VITALS
DIASTOLIC BLOOD PRESSURE: 62 MMHG | HEIGHT: 65 IN | RESPIRATION RATE: 18 BRPM | WEIGHT: 201 LBS | TEMPERATURE: 97.3 F | SYSTOLIC BLOOD PRESSURE: 100 MMHG | BODY MASS INDEX: 33.49 KG/M2 | HEART RATE: 70 BPM | OXYGEN SATURATION: 99 %

## 2025-07-14 DIAGNOSIS — R00.2 HEART PALPITATIONS: ICD-10-CM

## 2025-07-14 DIAGNOSIS — Z00.00 ROUTINE GENERAL MEDICAL EXAMINATION AT A HEALTH CARE FACILITY: Primary | ICD-10-CM

## 2025-07-14 DIAGNOSIS — D50.9 IRON DEFICIENCY ANEMIA, UNSPECIFIED IRON DEFICIENCY ANEMIA TYPE: ICD-10-CM

## 2025-07-14 DIAGNOSIS — E05.90 HYPERTHYROIDISM: ICD-10-CM

## 2025-07-14 DIAGNOSIS — K21.9 GASTROESOPHAGEAL REFLUX DISEASE WITHOUT ESOPHAGITIS: ICD-10-CM

## 2025-07-14 DIAGNOSIS — F41.1 GAD (GENERALIZED ANXIETY DISORDER): ICD-10-CM

## 2025-07-14 PROBLEM — N76.0 VAGINITIS: Status: RESOLVED | Noted: 2023-11-08 | Resolved: 2025-07-14

## 2025-07-14 LAB
ANION GAP SERPL CALC-SCNC: 5 MMOL/L (ref 2–12)
BUN SERPL-MCNC: 13 MG/DL (ref 6–20)
BUN/CREAT SERPL: 19 (ref 12–20)
CALCIUM SERPL-MCNC: 8.6 MG/DL (ref 8.5–10.1)
CHLORIDE SERPL-SCNC: 107 MMOL/L (ref 97–108)
CHOLEST SERPL-MCNC: 158 MG/DL
CO2 SERPL-SCNC: 25 MMOL/L (ref 21–32)
CREAT SERPL-MCNC: 0.7 MG/DL (ref 0.55–1.02)
FERRITIN SERPL-MCNC: 4 NG/ML (ref 8–252)
GLUCOSE SERPL-MCNC: 90 MG/DL (ref 65–100)
HDLC SERPL-MCNC: 47 MG/DL
HDLC SERPL: 3.4 (ref 0–5)
IRON SATN MFR SERPL: 14 % (ref 20–50)
IRON SERPL-MCNC: 49 UG/DL (ref 35–150)
LDLC SERPL CALC-MCNC: 102.2 MG/DL (ref 0–100)
POTASSIUM SERPL-SCNC: 4.1 MMOL/L (ref 3.5–5.1)
SODIUM SERPL-SCNC: 137 MMOL/L (ref 136–145)
TIBC SERPL-MCNC: 359 UG/DL (ref 250–450)
TRIGL SERPL-MCNC: 44 MG/DL
VLDLC SERPL CALC-MCNC: 8.8 MG/DL

## 2025-07-14 PROCEDURE — 99395 PREV VISIT EST AGE 18-39: CPT | Performed by: NURSE PRACTITIONER

## 2025-07-14 ASSESSMENT — ENCOUNTER SYMPTOMS
CHEST TIGHTNESS: 0
SHORTNESS OF BREATH: 0

## 2025-07-14 NOTE — PROGRESS NOTES
Chief Complaint   Patient presents with    Annual Exam         \"Have you been to the ER, urgent care clinic since your last visit?  Hospitalized since your last visit?\"    NO    “Have you seen or consulted any other health care providers outside of [facility] since your last visit?”    NO            Click Here for Release of Records Request           7/14/2025     8:06 AM   PHQ-9    Little interest or pleasure in doing things 0   Feeling down, depressed, or hopeless 0   PHQ-2 Score 0   PHQ-9 Total Score 0           Financial Resource Strain: Low Risk  (11/6/2023)    Overall Financial Resource Strain (CARDIA)     Difficulty of Paying Living Expenses: Not hard at all      Food Insecurity: No Food Insecurity (2/10/2025)    Hunger Vital Sign     Worried About Running Out of Food in the Last Year: Never true     Ran Out of Food in the Last Year: Never true          Health Maintenance Due   Topic Date Due    Varicella vaccine (1 of 2 - 13+ 2-dose series) Never done    Hepatitis B vaccine (1 of 3 - 19+ 3-dose series) Never done    DTaP/Tdap/Td vaccine (1 - Tdap) Never done

## 2025-07-14 NOTE — ASSESSMENT & PLAN NOTE
Secondary to menorrhagia.  Clinically stable. Recheck labs.    Continue daily iron supplement.   Follow up with gyn if condition worsens.    Orders:    Iron and TIBC; Future    Ferritin; Future

## 2025-07-14 NOTE — PROGRESS NOTES
Subjective:      Patient ID: Kianna Cuellar is a 39 y.o. female     HPI  CPE.  Following healthy diet, exercising regularly.    GLORIA.  Has been more consistent with taking iron supplement.  Last labs stable with mildly low ferritin. Continues to have heavy monthly menses. Followed by gyn.  GERD/intestinal metaplasia.  Taking pantoprazole daily with good symptom control, followed by GI.  Hyperthyroid.  Followed by Dr. Genao.  Has recently resumed methimazole.  Has occasional heart palpitations, non sustained.    LADONNA/OCD followed by psychiatry.  Fluvoxamine recently added to lexapro for OCD.    Recently began tirzepatide prescribed by Dr. Genao.  Tolerating medication without adverse side effects. Weight is down 20 lb over past 4 months.      Patient Active Problem List   Diagnosis    GERD (gastroesophageal reflux disease)    Hyperthyroidism    Iron deficiency anemia    LADONNA (generalized anxiety disorder)    Heart palpitations    Intestinal metaplasia of gastric mucosa    Obsessive-compulsive disorder     Current Outpatient Medications   Medication Sig    Tirzepatide-Weight Management (ZEPBOUND) 7.5 MG/0.5ML SOLN subCUTAneous injection (VIAL) Inject 0.5 mLs into the skin once a week    escitalopram (LEXAPRO) 10 MG tablet Take 1 tablet by mouth daily    ondansetron (ZOFRAN) 4 MG tablet Take 1 tablet by mouth daily as needed for Nausea or Vomiting    methIMAzole (TAPAZOLE) 5 MG tablet Take 0.5 tablets by mouth    pantoprazole (PROTONIX) 40 MG tablet TAKE 1 TABLET BY MOUTH DAILY, 30 MINUTES BEFORE BREAKFAST    fluvoxaMINE (LUVOX) 50 MG tablet Take 0.5 tablets by mouth nightly    ferrous sulfate (IRON 325) 325 (65 Fe) MG tablet Take 1 tablet by mouth daily (with breakfast)     No current facility-administered medications for this visit.     Social History     Tobacco Use    Smoking status: Never     Passive exposure: Past    Smokeless tobacco: Never   Vaping Use    Vaping status: Never Used   Substance Use Topics    Alcohol

## 2025-08-18 ENCOUNTER — OFFICE VISIT (OUTPATIENT)
Age: 40
End: 2025-08-18
Payer: COMMERCIAL

## 2025-08-18 VITALS
HEART RATE: 90 BPM | HEIGHT: 65 IN | BODY MASS INDEX: 32.92 KG/M2 | RESPIRATION RATE: 18 BRPM | DIASTOLIC BLOOD PRESSURE: 82 MMHG | SYSTOLIC BLOOD PRESSURE: 118 MMHG | OXYGEN SATURATION: 99 % | WEIGHT: 197.6 LBS

## 2025-08-18 DIAGNOSIS — E61.1 IRON DEFICIENCY: ICD-10-CM

## 2025-08-18 DIAGNOSIS — E05.00 GRAVES DISEASE: Primary | ICD-10-CM

## 2025-08-18 PROCEDURE — G2211 COMPLEX E/M VISIT ADD ON: HCPCS | Performed by: INTERNAL MEDICINE

## 2025-08-18 PROCEDURE — 99214 OFFICE O/P EST MOD 30 MIN: CPT | Performed by: INTERNAL MEDICINE

## 2025-08-18 ASSESSMENT — PATIENT HEALTH QUESTIONNAIRE - PHQ9
2. FEELING DOWN, DEPRESSED OR HOPELESS: NOT AT ALL
SUM OF ALL RESPONSES TO PHQ QUESTIONS 1-9: 0
1. LITTLE INTEREST OR PLEASURE IN DOING THINGS: NOT AT ALL
SUM OF ALL RESPONSES TO PHQ QUESTIONS 1-9: 0

## 2025-08-23 LAB
ALBUMIN SERPL-MCNC: 4.2 G/DL (ref 3.9–4.9)
ALP SERPL-CCNC: 64 IU/L (ref 44–121)
ALT SERPL-CCNC: 8 IU/L (ref 0–32)
AST SERPL-CCNC: 15 IU/L (ref 0–40)
BASOPHILS # BLD AUTO: 0 X10E3/UL (ref 0–0.2)
BASOPHILS NFR BLD AUTO: 0 %
BILIRUB DIRECT SERPL-MCNC: 0.13 MG/DL (ref 0–0.4)
BILIRUB SERPL-MCNC: 0.3 MG/DL (ref 0–1.2)
EOSINOPHIL # BLD AUTO: 0 X10E3/UL (ref 0–0.4)
EOSINOPHIL NFR BLD AUTO: 0 %
ERYTHROCYTE [DISTWIDTH] IN BLOOD BY AUTOMATED COUNT: 16.5 % (ref 11.7–15.4)
HCT VFR BLD AUTO: 34 % (ref 34–46.6)
HGB BLD-MCNC: 10.2 G/DL (ref 11.1–15.9)
IMM GRANULOCYTES # BLD AUTO: 0 X10E3/UL (ref 0–0.1)
IMM GRANULOCYTES NFR BLD AUTO: 0 %
IRON SATN MFR SERPL: 6 % (ref 15–55)
IRON SERPL-MCNC: 25 UG/DL (ref 27–159)
LYMPHOCYTES # BLD AUTO: 2 X10E3/UL (ref 0.7–3.1)
LYMPHOCYTES NFR BLD AUTO: 35 %
MCH RBC QN AUTO: 23.7 PG (ref 26.6–33)
MCHC RBC AUTO-ENTMCNC: 30 G/DL (ref 31.5–35.7)
MCV RBC AUTO: 79 FL (ref 79–97)
MONOCYTES # BLD AUTO: 0.6 X10E3/UL (ref 0.1–0.9)
MONOCYTES NFR BLD AUTO: 10 %
NEUTROPHILS # BLD AUTO: 3.1 X10E3/UL (ref 1.4–7)
NEUTROPHILS NFR BLD AUTO: 55 %
PLATELET # BLD AUTO: 302 X10E3/UL (ref 150–450)
PROT SERPL-MCNC: 7 G/DL (ref 6–8.5)
RBC # BLD AUTO: 4.3 X10E6/UL (ref 3.77–5.28)
T3 SERPL-MCNC: 113 NG/DL (ref 71–180)
T4 FREE SERPL-MCNC: 1.26 NG/DL (ref 0.82–1.77)
TIBC SERPL-MCNC: 388 UG/DL (ref 250–450)
TSH SERPL DL<=0.005 MIU/L-ACNC: 1.06 UIU/ML (ref 0.45–4.5)
UIBC SERPL-MCNC: 363 UG/DL (ref 131–425)
WBC # BLD AUTO: 5.6 X10E3/UL (ref 3.4–10.8)

## 2025-08-24 ENCOUNTER — RESULTS FOLLOW-UP (OUTPATIENT)
Age: 40
End: 2025-08-24

## 2025-08-24 DIAGNOSIS — E61.1 IRON DEFICIENCY: Primary | ICD-10-CM

## 2025-08-26 LAB
GLIADIN PEPTIDE IGA SER-ACNC: 4 UNITS (ref 0–19)
GLIADIN PEPTIDE IGG SER-ACNC: 2 UNITS (ref 0–19)
IGA SERPL-MCNC: 271 MG/DL (ref 87–352)
TTG IGA SER-ACNC: <2 U/ML (ref 0–3)
TTG IGG SER-ACNC: 4 U/ML (ref 0–5)

## 2025-08-31 ENCOUNTER — OFFICE VISIT (OUTPATIENT)
Age: 40
End: 2025-08-31

## 2025-08-31 VITALS
TEMPERATURE: 98.6 F | BODY MASS INDEX: 32.32 KG/M2 | DIASTOLIC BLOOD PRESSURE: 77 MMHG | HEIGHT: 65 IN | RESPIRATION RATE: 18 BRPM | OXYGEN SATURATION: 97 % | SYSTOLIC BLOOD PRESSURE: 108 MMHG | WEIGHT: 194 LBS | HEART RATE: 76 BPM

## 2025-08-31 DIAGNOSIS — J06.9 VIRAL UPPER RESPIRATORY INFECTION: Primary | ICD-10-CM

## 2025-08-31 DIAGNOSIS — J02.9 SORE THROAT: ICD-10-CM

## 2025-08-31 LAB
Lab: NORMAL
PERFORMING INSTRUMENT: NORMAL
QC PASS/FAIL: NORMAL
S PYO AG THROAT QL: NORMAL
SARS-COV-2, POC: NORMAL

## 2025-08-31 RX ORDER — IPRATROPIUM BROMIDE 21 UG/1
2 SPRAY, METERED NASAL EVERY 12 HOURS
Qty: 30 ML | Refills: 0 | Status: SHIPPED | OUTPATIENT
Start: 2025-08-31

## (undated) DEVICE — TUBING IRRIG COMPATIBLE W ERBE MEDIVATOR PMP HYDR

## (undated) DEVICE — FORCEPS BX L240CM JAW DIA2.4MM ORNG L CAP W/ NDL DISP RAD

## (undated) DEVICE — ORISE PROKNIFE 3.0 MM ELECTRODE: Brand: ORISE™ PROKNIFE

## (undated) DEVICE — ORISE PROKNIFE 1.5 MM ELECTRODE: Brand: ORISE™ PROKNIFE

## (undated) DEVICE — CONTAINER SPEC 20ML NEUT BUFF FRMLN PREFIL STATLAB